# Patient Record
Sex: FEMALE | Race: BLACK OR AFRICAN AMERICAN | NOT HISPANIC OR LATINO | URBAN - METROPOLITAN AREA
[De-identification: names, ages, dates, MRNs, and addresses within clinical notes are randomized per-mention and may not be internally consistent; named-entity substitution may affect disease eponyms.]

---

## 2023-10-01 ENCOUNTER — HOSPITAL ENCOUNTER (EMERGENCY)
Facility: HOSPITAL | Age: 58
Discharge: HOME | End: 2023-10-01
Attending: EMERGENCY MEDICINE
Payer: COMMERCIAL

## 2023-10-01 ENCOUNTER — APPOINTMENT (OUTPATIENT)
Dept: RADIOLOGY | Facility: HOSPITAL | Age: 58
End: 2023-10-01
Payer: COMMERCIAL

## 2023-10-01 VITALS
TEMPERATURE: 98.1 F | BODY MASS INDEX: 23.95 KG/M2 | RESPIRATION RATE: 16 BRPM | HEART RATE: 87 BPM | SYSTOLIC BLOOD PRESSURE: 149 MMHG | HEIGHT: 68 IN | DIASTOLIC BLOOD PRESSURE: 85 MMHG | WEIGHT: 158 LBS | OXYGEN SATURATION: 100 %

## 2023-10-01 DIAGNOSIS — M25.572 ACUTE LEFT ANKLE PAIN: ICD-10-CM

## 2023-10-01 DIAGNOSIS — S93.402A SPRAIN OF LEFT ANKLE, INITIAL ENCOUNTER: Primary | ICD-10-CM

## 2023-10-01 PROCEDURE — 70450 CT HEAD/BRAIN W/O DYE: CPT | Performed by: STUDENT IN AN ORGANIZED HEALTH CARE EDUCATION/TRAINING PROGRAM

## 2023-10-01 PROCEDURE — 73590 X-RAY EXAM OF LOWER LEG: CPT | Mod: LT,FY

## 2023-10-01 PROCEDURE — 73590 X-RAY EXAM OF LOWER LEG: CPT | Mod: LEFT SIDE | Performed by: RADIOLOGY

## 2023-10-01 PROCEDURE — 99284 EMERGENCY DEPT VISIT MOD MDM: CPT | Performed by: EMERGENCY MEDICINE

## 2023-10-01 PROCEDURE — 73610 X-RAY EXAM OF ANKLE: CPT | Mod: LEFT SIDE | Performed by: RADIOLOGY

## 2023-10-01 PROCEDURE — 73630 X-RAY EXAM OF FOOT: CPT | Mod: LEFT SIDE | Performed by: RADIOLOGY

## 2023-10-01 PROCEDURE — 99284 EMERGENCY DEPT VISIT MOD MDM: CPT | Mod: 25 | Performed by: EMERGENCY MEDICINE

## 2023-10-01 PROCEDURE — 2500000001 HC RX 250 WO HCPCS SELF ADMINISTERED DRUGS (ALT 637 FOR MEDICARE OP): Mod: SE | Performed by: EMERGENCY MEDICINE

## 2023-10-01 PROCEDURE — 73630 X-RAY EXAM OF FOOT: CPT | Mod: LT,FY

## 2023-10-01 PROCEDURE — 73610 X-RAY EXAM OF ANKLE: CPT | Mod: LT

## 2023-10-01 PROCEDURE — 70450 CT HEAD/BRAIN W/O DYE: CPT

## 2023-10-01 RX ORDER — ACETAMINOPHEN 325 MG/1
650 TABLET ORAL ONCE
Status: COMPLETED | OUTPATIENT
Start: 2023-10-01 | End: 2023-10-01

## 2023-10-01 RX ADMIN — ACETAMINOPHEN 650 MG: 325 TABLET, FILM COATED ORAL at 14:19

## 2023-10-01 ASSESSMENT — PAIN - FUNCTIONAL ASSESSMENT: PAIN_FUNCTIONAL_ASSESSMENT: 0-10

## 2023-10-01 ASSESSMENT — COLUMBIA-SUICIDE SEVERITY RATING SCALE - C-SSRS
4. HAVE YOU HAD THESE THOUGHTS AND HAD SOME INTENTION OF ACTING ON THEM?: NO
6. HAVE YOU EVER DONE ANYTHING, STARTED TO DO ANYTHING, OR PREPARED TO DO ANYTHING TO END YOUR LIFE?: YES
6. HAVE YOU EVER DONE ANYTHING, STARTED TO DO ANYTHING, OR PREPARED TO DO ANYTHING TO END YOUR LIFE?: NO
1. IN THE PAST MONTH, HAVE YOU WISHED YOU WERE DEAD OR WISHED YOU COULD GO TO SLEEP AND NOT WAKE UP?: YES
4. HAVE YOU HAD THESE THOUGHTS AND HAD SOME INTENTION OF ACTING ON THEM?: YES
2. HAVE YOU ACTUALLY HAD ANY THOUGHTS OF KILLING YOURSELF?: YES
5. HAVE YOU STARTED TO WORK OUT OR WORKED OUT THE DETAILS OF HOW TO KILL YOURSELF? DO YOU INTEND TO CARRY OUT THIS PLAN?: NO
5. HAVE YOU STARTED TO WORK OUT OR WORKED OUT THE DETAILS OF HOW TO KILL YOURSELF? DO YOU INTEND TO CARRY OUT THIS PLAN?: YES
6. HAVE YOU EVER DONE ANYTHING, STARTED TO DO ANYTHING, OR PREPARED TO DO ANYTHING TO END YOUR LIFE?: NO
2. HAVE YOU ACTUALLY HAD ANY THOUGHTS OF KILLING YOURSELF?: NO
1. IN THE PAST MONTH, HAVE YOU WISHED YOU WERE DEAD OR WISHED YOU COULD GO TO SLEEP AND NOT WAKE UP?: NO

## 2023-10-01 ASSESSMENT — PAIN DESCRIPTION - ORIENTATION: ORIENTATION: RIGHT

## 2023-10-01 ASSESSMENT — PAIN DESCRIPTION - PAIN TYPE: TYPE: ACUTE PAIN

## 2023-10-01 ASSESSMENT — PAIN DESCRIPTION - LOCATION: LOCATION: ANKLE

## 2023-10-01 ASSESSMENT — PAIN SCALES - GENERAL: PAINLEVEL_OUTOF10: 10 - WORST POSSIBLE PAIN
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
## 2023-10-05 NOTE — ED PROVIDER NOTES
I received Alesia Fernandez in signout from Dr. Ribeiro.  Please see the previous note for all HPI, PE and MDM up to the time of signout at 3 pm on 10/1.     Briefly, 58-year-old female with past medical history of epilepsy and tremor with past pin placement in bilateral ankles presenting today after fall 9/30 at 4 PM. Pt was awaiting the result of CT head wo contrast which resulted in no acute cerebral hemorrhage, depressed skill fracture or  acute intracranial trauma. Pt was then discharged with instructions to take Tylenol at home for pain control. An ACE wrap was placed on the left foot. Pt was given instructions on taking care of her left ankle pain likely abrahan to an ankle sprain. Pt was advised to return to the Emergency Department if pain is out of control, swelling increases, and if joint is warmth and erythematous.     In brief Alesia Fernandez is an 58 y.o. female presenting for   Chief Complaint   Patient presents with    Ankle Pain     Pt fell yesterday on 9/30 into closet door and injured Right ankle. Pt denies thinners or LOC. Pt reports hitting right side of forehead.   .  At the time of signout we were awaiting:        Pt Disposition: Home    Procedures         Juli Torres MD  Resident  10/05/23 6664

## 2023-11-13 ENCOUNTER — HOSPITAL ENCOUNTER (EMERGENCY)
Facility: HOSPITAL | Age: 58
Discharge: HOME | End: 2023-11-14
Attending: EMERGENCY MEDICINE
Payer: COMMERCIAL

## 2023-11-13 DIAGNOSIS — F41.9 ANXIETY: ICD-10-CM

## 2023-11-13 DIAGNOSIS — Z00.8 EVALUATION BY PSYCHIATRIC SERVICE REQUIRED: Primary | ICD-10-CM

## 2023-11-13 LAB
ALBUMIN SERPL BCP-MCNC: 4.4 G/DL (ref 3.4–5)
ALP SERPL-CCNC: 35 U/L (ref 33–110)
ALT SERPL W P-5'-P-CCNC: 6 U/L (ref 7–45)
ANION GAP SERPL CALC-SCNC: 12 MMOL/L (ref 10–20)
AST SERPL W P-5'-P-CCNC: 10 U/L (ref 9–39)
BILIRUB SERPL-MCNC: 0.4 MG/DL (ref 0–1.2)
BUN SERPL-MCNC: 8 MG/DL (ref 6–23)
CALCIUM SERPL-MCNC: 9.3 MG/DL (ref 8.6–10.6)
CHLORIDE SERPL-SCNC: 102 MMOL/L (ref 98–107)
CO2 SERPL-SCNC: 24 MMOL/L (ref 21–32)
CREAT SERPL-MCNC: 0.51 MG/DL (ref 0.5–1.05)
ERYTHROCYTE [DISTWIDTH] IN BLOOD BY AUTOMATED COUNT: 13.2 % (ref 11.5–14.5)
GFR SERPL CREATININE-BSD FRML MDRD: >90 ML/MIN/1.73M*2
GLUCOSE SERPL-MCNC: 100 MG/DL (ref 74–99)
HCT VFR BLD AUTO: 36.2 % (ref 36–46)
HGB BLD-MCNC: 12 G/DL (ref 12–16)
MAGNESIUM SERPL-MCNC: 1.89 MG/DL (ref 1.6–2.4)
MCH RBC QN AUTO: 27.8 PG (ref 26–34)
MCHC RBC AUTO-ENTMCNC: 33.1 G/DL (ref 32–36)
MCV RBC AUTO: 84 FL (ref 80–100)
NRBC BLD-RTO: 0 /100 WBCS (ref 0–0)
PLATELET # BLD AUTO: 218 X10*3/UL (ref 150–450)
POTASSIUM SERPL-SCNC: 3.7 MMOL/L (ref 3.5–5.3)
PROT SERPL-MCNC: 7.4 G/DL (ref 6.4–8.2)
RBC # BLD AUTO: 4.32 X10*6/UL (ref 4–5.2)
SODIUM SERPL-SCNC: 134 MMOL/L (ref 136–145)
WBC # BLD AUTO: 6.1 X10*3/UL (ref 4.4–11.3)

## 2023-11-13 PROCEDURE — 85027 COMPLETE CBC AUTOMATED: CPT | Performed by: EMERGENCY MEDICINE

## 2023-11-13 PROCEDURE — 80053 COMPREHEN METABOLIC PANEL: CPT | Performed by: EMERGENCY MEDICINE

## 2023-11-13 PROCEDURE — 93010 ELECTROCARDIOGRAM REPORT: CPT | Performed by: EMERGENCY MEDICINE

## 2023-11-13 PROCEDURE — 83735 ASSAY OF MAGNESIUM: CPT | Performed by: EMERGENCY MEDICINE

## 2023-11-13 PROCEDURE — 99285 EMERGENCY DEPT VISIT HI MDM: CPT | Performed by: EMERGENCY MEDICINE

## 2023-11-13 PROCEDURE — 36415 COLL VENOUS BLD VENIPUNCTURE: CPT | Performed by: EMERGENCY MEDICINE

## 2023-11-13 PROCEDURE — 99285 EMERGENCY DEPT VISIT HI MDM: CPT | Mod: 25

## 2023-11-13 PROCEDURE — 2500000001 HC RX 250 WO HCPCS SELF ADMINISTERED DRUGS (ALT 637 FOR MEDICARE OP): Mod: SE

## 2023-11-13 RX ORDER — LORAZEPAM 1 MG/1
TABLET ORAL
Status: COMPLETED
Start: 2023-11-13 | End: 2023-11-13

## 2023-11-13 RX ORDER — LORAZEPAM 0.5 MG/1
2 TABLET ORAL ONCE
Status: COMPLETED | OUTPATIENT
Start: 2023-11-13 | End: 2023-11-13

## 2023-11-13 RX ADMIN — LORAZEPAM 2 MG: 0.5 TABLET ORAL at 23:13

## 2023-11-13 RX ADMIN — LORAZEPAM 2 MG: 1 TABLET ORAL at 23:13

## 2023-11-13 SDOH — HEALTH STABILITY: MENTAL HEALTH: BEHAVIORS/MOOD: ANXIOUS

## 2023-11-13 ASSESSMENT — LIFESTYLE VARIABLES
HAVE PEOPLE ANNOYED YOU BY CRITICIZING YOUR DRINKING: NO
REASON UNABLE TO ASSESS: NO
EVER HAD A DRINK FIRST THING IN THE MORNING TO STEADY YOUR NERVES TO GET RID OF A HANGOVER: NO
EVER FELT BAD OR GUILTY ABOUT YOUR DRINKING: NO
HAVE YOU EVER FELT YOU SHOULD CUT DOWN ON YOUR DRINKING: NO

## 2023-11-13 ASSESSMENT — COLUMBIA-SUICIDE SEVERITY RATING SCALE - C-SSRS
6. HAVE YOU EVER DONE ANYTHING, STARTED TO DO ANYTHING, OR PREPARED TO DO ANYTHING TO END YOUR LIFE?: NO
1. IN THE PAST MONTH, HAVE YOU WISHED YOU WERE DEAD OR WISHED YOU COULD GO TO SLEEP AND NOT WAKE UP?: NO
2. HAVE YOU ACTUALLY HAD ANY THOUGHTS OF KILLING YOURSELF?: NO

## 2023-11-13 ASSESSMENT — PAIN SCALES - GENERAL: PAINLEVEL_OUTOF10: 0 - NO PAIN

## 2023-11-13 ASSESSMENT — PAIN - FUNCTIONAL ASSESSMENT: PAIN_FUNCTIONAL_ASSESSMENT: 0-10

## 2023-11-14 ENCOUNTER — DOCUMENTATION (OUTPATIENT)
Dept: SOCIAL WORK | Age: 58
End: 2023-11-14

## 2023-11-14 ENCOUNTER — CLINICAL SUPPORT (OUTPATIENT)
Dept: EMERGENCY MEDICINE | Facility: HOSPITAL | Age: 58
End: 2023-11-14
Payer: COMMERCIAL

## 2023-11-14 VITALS
OXYGEN SATURATION: 100 % | WEIGHT: 164 LBS | BODY MASS INDEX: 24.86 KG/M2 | HEIGHT: 68 IN | DIASTOLIC BLOOD PRESSURE: 68 MMHG | RESPIRATION RATE: 16 BRPM | SYSTOLIC BLOOD PRESSURE: 123 MMHG | TEMPERATURE: 97.9 F | HEART RATE: 84 BPM

## 2023-11-14 LAB
AMPHETAMINES UR QL SCN: NORMAL
APAP SERPL-MCNC: <10 UG/ML
ATRIAL RATE: 121 BPM
BARBITURATES UR QL SCN: NORMAL
BENZODIAZ UR QL SCN: NORMAL
BZE UR QL SCN: NORMAL
CANNABINOIDS UR QL SCN: NORMAL
ETHANOL SERPL-MCNC: <10 MG/DL
FENTANYL+NORFENTANYL UR QL SCN: NORMAL
OPIATES UR QL SCN: NORMAL
OXYCODONE+OXYMORPHONE UR QL SCN: NORMAL
P AXIS: 76 DEGREES
P OFFSET: 198 MS
P ONSET: 146 MS
PCP UR QL SCN: NORMAL
PR INTERVAL: 164 MS
Q ONSET: 228 MS
QRS COUNT: 20 BEATS
QRS DURATION: 56 MS
QT INTERVAL: 312 MS
QTC CALCULATION(BAZETT): 443 MS
QTC FREDERICIA: 394 MS
R AXIS: 73 DEGREES
SALICYLATES SERPL-MCNC: <3 MG/DL
T AXIS: 85 DEGREES
T OFFSET: 384 MS
VENTRICULAR RATE: 121 BPM

## 2023-11-14 PROCEDURE — 36415 COLL VENOUS BLD VENIPUNCTURE: CPT | Performed by: EMERGENCY MEDICINE

## 2023-11-14 PROCEDURE — 93005 ELECTROCARDIOGRAM TRACING: CPT

## 2023-11-14 PROCEDURE — 80329 ANALGESICS NON-OPIOID 1 OR 2: CPT | Mod: 59 | Performed by: EMERGENCY MEDICINE

## 2023-11-14 PROCEDURE — 80307 DRUG TEST PRSMV CHEM ANLYZR: CPT | Performed by: EMERGENCY MEDICINE

## 2023-11-14 PROCEDURE — 80320 DRUG SCREEN QUANTALCOHOLS: CPT | Performed by: EMERGENCY MEDICINE

## 2023-11-14 PROCEDURE — 99222 1ST HOSP IP/OBS MODERATE 55: CPT

## 2023-11-14 SDOH — HEALTH STABILITY: MENTAL HEALTH: WISH TO BE DEAD (PAST 1 MONTH): NO

## 2023-11-14 SDOH — ECONOMIC STABILITY: HOUSING INSECURITY: FEELS SAFE LIVING IN HOME: YES

## 2023-11-14 SDOH — HEALTH STABILITY: MENTAL HEALTH: DEPRESSION SYMPTOMS: NO PROBLEMS REPORTED OR OBSERVED.

## 2023-11-14 SDOH — HEALTH STABILITY: MENTAL HEALTH: SUICIDAL BEHAVIOR (LIFETIME): YES

## 2023-11-14 SDOH — ECONOMIC STABILITY: GENERAL

## 2023-11-14 SDOH — HEALTH STABILITY: MENTAL HEALTH: ANXIETY SYMPTOMS: GENERALIZED;PANIC ATTACK

## 2023-11-14 SDOH — HEALTH STABILITY: MENTAL HEALTH: IN THE PAST WEEK, HAVE YOU BEEN HAVING THOUGHTS ABOUT KILLING YOURSELF?: NO

## 2023-11-14 SDOH — HEALTH STABILITY: MENTAL HEALTH: HAVE YOU EVER TRIED TO KILL YOURSELF?: YES

## 2023-11-14 SDOH — HEALTH STABILITY: MENTAL HEALTH: IN THE PAST FEW WEEKS, HAVE YOU FELT THAT YOU OR YOUR FAMILY WOULD BE BETTER OFF IF YOU WERE DEAD?: NO

## 2023-11-14 SDOH — HEALTH STABILITY: MENTAL HEALTH: SUICIDAL BEHAVIOR (3 MONTHS): NO

## 2023-11-14 SDOH — HEALTH STABILITY: MENTAL HEALTH: NON-SPECIFIC ACTIVE SUICIDAL THOUGHTS (PAST 1 MONTH): NO

## 2023-11-14 SDOH — HEALTH STABILITY: MENTAL HEALTH: ARE YOU HAVING THOUGHTS OF KILLING YOURSELF RIGHT NOW?: NO

## 2023-11-14 SDOH — HEALTH STABILITY: MENTAL HEALTH: IN THE PAST FEW WEEKS, HAVE YOU WISHED YOU WERE DEAD?: NO

## 2023-11-14 ASSESSMENT — LIFESTYLE VARIABLES
SUBSTANCE_ABUSE_PAST_12_MONTHS: NO
PRESCIPTION_ABUSE_PAST_12_MONTHS: NO

## 2023-11-14 ASSESSMENT — PAIN SCALES - GENERAL: PAINLEVEL_OUTOF10: 0 - NO PAIN
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
## 2023-11-14 NOTE — ED TRIAGE NOTES
Patient states that she was taking medication for epilepsy. Patient missed a dose of seizure medication and she is feeling anxious. Patient feels overwhelmed by medications and diagnoses. Patient feels anxious.

## 2023-11-14 NOTE — ED PROVIDER NOTES
CC: Anxiety     HPI: Patient is a 58-year-old female with history of anxiety, schizophrenia, tremors, presenting to the emergency department today with increased anxiety and tremors.  Patient reports on Friday that she lost her Risperdal and valproate and has not been able to take it for 3 days.  She finally found it this morning and took it but has been feeling increasingly anxious since this morning and is coming into the emergency department concerned that she is having another panic attack.  She has a lot of life stressors at the moment, and says she does not really have a support system at home.  Has 5 sons and daughter's that live in the city but they have not come to visit her in a long time.  She feels well and feels like her tremors are progressively getting worse.  Denies chest pain, shortness of breath, abdominal pain, or changes in urination/stool.  Reports she follows closely with neurology clinic and has another appointment on the 8th of December coming up to reassess her tremors. Reports her voices have been worse recently since missing a couple doses of risperidone.      Records Reviewed:  Recent available ED and inpatient notes reviewed in EMR.    PMHx/PSHx:  Per HPI.   - has no past medical history on file.  - has no past surgical history on file.  - does not have a problem list on file.    Medications:  Reviewed in EMR. See EMR for complete list of medications and doses.    Allergies:  Patient has no known allergies.    Social History:  - Tobacco:  has no history on file for tobacco use.   - Alcohol:  has no history on file for alcohol use.   - Illicit Drugs:  has no history on file for drug use.     ROS:  Per HPI.       ???????????????????????????????????????????????????????????????  Triage Vitals:  T 36.7 °C (98 °F)  HR  121  /67  RR 16  O2 100 % None (Room air)    Physical Exam  Vitals and nursing note reviewed.   Constitutional:       General: She is not in acute distress.      Appearance: She is well-developed.   HENT:      Head: Normocephalic and atraumatic.   Eyes:      Conjunctiva/sclera: Conjunctivae normal.   Cardiovascular:      Rate and Rhythm: Normal rate and regular rhythm.      Heart sounds: No murmur heard.  Pulmonary:      Effort: Pulmonary effort is normal. No respiratory distress.      Breath sounds: Normal breath sounds.   Abdominal:      Palpations: Abdomen is soft.      Tenderness: There is no abdominal tenderness.   Musculoskeletal:         General: No swelling.      Cervical back: Neck supple.   Skin:     General: Skin is warm and dry.      Capillary Refill: Capillary refill takes less than 2 seconds.   Neurological:      Mental Status: She is alert.      Comments: Resting tremors of the bilateral upper and lower extremities   Psychiatric:         Mood and Affect: Mood is anxious. Affect is tearful.       ???????????????????????????????????????????????????????????????  EKG:   Tachycardic to 121, regular rhythm.  CA, QRS, QTc intervals within normal limits.  No ST elevations, depressions, or T wave inversions.  Sinus tachycardia.    Medical Decision Making   Patient is a 58-year-old female presenting to the emergency panic attack.  On arrival, tachycardic to 121, rest of vitals within normal limits.  On examination, patient appears anxious and says this feels like her typical panic attacks.  She took her risperidone earlier today as prescribed and brought  it with her here in the emergency department.  Has been hearing more voices than usual recently, they are confusing her and are telling her negative things about herself. Denies SI or HI, no active plan. Was recently hospitalized in June of this year psychiatric decompensation. Labs acquired in triage showed a sodium of 134, potassium of 3.7, and a normal CBC.  Given her chronic history of tremors, I think the likely cause of her symptoms today is panic attack and anxiety.  She has been evaluated extensively by  neurology and lower concern for a primary neurologic diagnosis at this time.  We will give her 2 mg Ativan orally and reassess her symptoms. On reassessment after ativan, patient feels significantly clinically improved, says her anxiety has gone away. EPAT evaluated patient and agrees with out assessment. Patient will be discharged home with close psychiatric/neurology followup.    Diagnoses as of 11/14/23 1712   Evaluation by psychiatric service required   Anxiety       Social Determinants Limiting Care:      Disposition:   Discharge    Ceasar Locke MD  Emergency Medicine PGY2      Procedures ? [employer] last updated 11/13/2023 11:11 PM          Ceasar Locke MD  Resident  11/14/23 9825

## 2023-11-14 NOTE — CONSULTS
Referring Provider  Nicolette Martin    History Of Present Illness  Alesia Fernandez is a 58 y.o. female presenting to [facility] ED on 11/13/23 for c/c of anxiety after missing 1 day of medications.     Past Medical History  Epilepsy (chronic tremor)    Past Psychiatric History  Schizophrenia, unspecified anxiety, alcohol & cocaine abuse (both in remission), unspecified ID    Social History  She has no history on file for tobacco use, alcohol use, and drug use.     Current living situation:   Current employment/source of income:    Born and raised:   Education:   Legal history:   Access to weapons: denies    OP Mental Health Tx: [facility] Admissions: Extensive hx, including [facility] 6/2023, 3/2023; [facility] 7/2022, 5/2022; [facility] 7/2021; [facility] 5/2021; [facility] 9/2016; [facility] 6/2016; [facility] 5/2016; [facility] 9/2015, 8/2015; [facility] 8/2015, 7/2015; [facility];  [facility] 6/2014; [facility] 6/2014;  [facility] 3/2014; [facility] 3/2014;  [facility] 9/2013; multiple prior.    Self-harm or Suicide Attempts: Per chart- hx of suicide attempts via OD on pills, OD on drugs, and bleach ingestion.    Past/current Psychiatric Meds/Treatments: Pt reports current meds as Depakote, Risperdal, and Trazadone. Past meds include IM Invega, IM Haldol, Cogentin, Lamictal, hydroxyzine, Zoloft, Prozac, Lithium, and Zyprexa.      Name/Phone Number: Portia Cesar   Last Appointment Date: a week ago, per pt  Provider Name/Phone Number: Dr. Longoria  Provider Last Appointment Date: 2 weeks ago, per pt     Support System:  (adult children)     Living Arrangement: Lives alone, Apartment       Allergies  Patient has no known allergies.    Review of Systems    Psychiatric ROS - Adult  Anxiety: General Anxiety Disorder (TOMMY)TOMMY Behaviors: excessive anxiety/worry and difficulty concentrating  Depression: negative  Delirium: negative  Psychosis: auditory hallucinations and intermittent,  "chronic AH. None at time of interview  Imelda: negative  Safety Issues: none    Physical Exam      Mental Status Exam  General: 57 y/o AA female, reclined in ED cot in hallway care space. Fair grooming.   Appearance: appears stated age  Attitude: calm, cooperative  Behavior: appropriate eye contact  Motor Activity: no PMR. Chronic tremor observed in hands. No TD/EPS. Gait not assessed  Speech: low volume, appropriate rate, rhythm, tone. Spontaneous, fluent.   Mood: \"okay\"  Affect: blunted, smiles when discussing her 15 grandchildren  Thought Process: linear, concrete  Thought Content: denies SI/HI & overt delusions  Thought Perception: denies current AVH. Reports chronic, fleeting AH that are not command in nature. Does not appear to be RTIS during assessment.   Cognition: alert, oriented x3  Insight: fair  Judgement: appropriate/fair    Psychiatric Risk Assessment  Violence Risk Assessment: major mental illness  Acute Risk of Harm to Others is Considered: low   Suicide Risk Assessment: chronic medical illness, prior suicide attempt, and unmarried  Protective Factors against Suicide: adherence to  treatment, hopefulness/future orientation, positive family relationships, and social support/connectedness  Acute Risk of Harm to Self is Considered: low    Last Recorded Vitals  Blood pressure 123/68, pulse 84, temperature 36.6 °C (97.9 °F), temperature source Tympanic, resp. rate 16, height 1.727 m (5' 8\"), weight 74.4 kg (164 lb), SpO2 100 %.    Relevant Results    Assessment/Plan     The pt was initially seen by psychiatric social work & recommended for outpatient follow up. Following the assessment, the ED reports the pt had a verbal outburst & threatened bystanders & ED staff. All staff members present during this reported episode have gone home/off shift, so specific details are unclear. The ED resident requested a follow-up evaluation.    On re-assessment this morning, the pt is seen laying in ED cot with an empty " "breakfast tray at her side. She explains that she arrived to ED feeling anxious due to missing 2 days worth of her risperdal & depakote (for seizures) as she misplaced her bubble pack of meds. The pt is calm, cooperative & forward thinking. She is excited for Thanksgiving next week to see her family. She has close outpatient follow-up through [facility]. She does voice chronic, intermittent AH that \"ask me how I'm doing\"; she denies that the AH are command in nature. She reports feeling anxious that she was going to have a seizure & was having a hard time settling herself down. She did receive ativan 2 mg PO last night at 2313 with positive clinical response.     When asked of reports of verbal agitation &/or threats reported by ED staff, the pt denies this. She denies feeling agitated since arrival or having any outbursts. She denies responding to any hallucinatory stimuli.    I agree with prior recommendation for outpatient follow-up. No indication for inpatient psychiatric admission at this time.    Impression  Schizophrenia  TOMMY  ID      Recommendations  Following a chart review, safety risk assessment, interview with pt and ED staff, pt does not meet criteria for involuntary inpatient psychiatric hospitalization at this time. I am not recommending any medication management changes. Please encourage pt to follow-up with outpatient provider.     I discussed these recommendations with the current ED provider (Dr. Rubio) who was in agreement with above plan of care.       I spent 60 minutes in the professional and overall care of this patient.      Medication Consent  Medication Consent: n/a; consult service    Stephanie Bolivar, APRN-CNP        "

## 2023-11-14 NOTE — ED NOTES
W ED NOTE 11/14/23  Patient is being provided an follow up ED appointment for 12/4/23 @ 2p  [facility] [id number]  With Dr. Ken Fernandez   [phone]    Patient has appointment reminders on her profile         [facility]ylar Sanchez, [facility]  11/14/23 0999

## 2023-11-14 NOTE — PROGRESS NOTES
Alesia Fernandez is a 58 y.o. female discharging from ED. RN stated need for staff to accompany Pt to car.    Assessment/Plan   Met with Pt. She confirmed she had her cell and her address is correct in the system. CHW Skylar Sanchez consulted for ride. Roundtrip dispatched and CHW accompanied Pt to ride.     FRANDY West

## 2023-11-14 NOTE — DISCHARGE INSTRUCTIONS
Please return to the emergency department, should you have any worsening symptoms, are unable to get an appointment with your primary care physician within the discussed time frame, or have any further concerns.       Please follow up with:   Primary care within 3 days.  Please continue to take your olanzapine.    You make take ibuprofen or tylenol for pain. You may alternate ibuprofen and tylenol every 6 hours for better pain control.    Do not exceed 2400mg of ibuprofen or 4000mg of tylenol in a 24 hour period.      If you were given antibiotics, please complete the entire course. If you are unable to tolerate your antibiotics, please follow up with your primary care doctor or return to the emergency department.

## 2023-11-14 NOTE — PROGRESS NOTES
Emergency Medicine Transition of Care Note.      I received Alesia Fernandez in signout from Dr. Locke.  Please see the previous ED provider note for all HPI, PE and MDM up to the time of signout at 0700. This is in addition to the primary record. In brief Alesia Fernandez is an 58 y.o. female presenting for   Chief Complaint   Patient presents with    Anxiety    .  At the time of signout we were awaiting: awaiting repeat EPAT eval.        Medical Decision Making    Briefly this is a 58-year-old female with history of hallucinations, and extensive psychiatric admissions initially presented to the ED with anxiety, had acute episode of auditory hallucinations while in the ED.  On my reevaluation, patient was stable, speaking clearly, denying any active auditory hallucinations at this time.  Patient was reevaluated by EPAT team, and did not meet criteria for admission.  She is currently stable, denying any SI/HI at this time.  Patient is well-established in the outpatient setting, and is comfortable with discharge home.  Discharged in stable condition.      Final diagnoses:   [Z00.8] Evaluation by psychiatric service required   [F41.9] Anxiety     Dispo: discharge home, return precautions given.      Cricket Rubio, DO  Emergency Medicine , PGY-2

## 2023-11-28 PROBLEM — Z91.148 NONCOMPLIANCE WITH MEDICATION REGIMEN: Status: ACTIVE | Noted: 2018-02-26

## 2023-11-28 PROBLEM — F17.200 NICOTINE USE DISORDER: Status: ACTIVE | Noted: 2022-05-18

## 2023-11-28 PROBLEM — E66.9 OBESITY: Status: ACTIVE | Noted: 2018-03-05

## 2023-11-28 PROBLEM — G40.309 NONINTRACTABLE GENERALIZED IDIOPATHIC EPILEPSY WITHOUT STATUS EPILEPTICUS (MULTI): Status: ACTIVE | Noted: 2017-05-11

## 2023-11-28 PROBLEM — R26.9 GAIT ABNORMALITY: Status: ACTIVE | Noted: 2019-03-11

## 2023-11-28 PROBLEM — Z98.51 TUBAL LIGATION STATUS: Status: ACTIVE | Noted: 2023-11-28

## 2023-11-28 PROBLEM — M25.511 CHRONIC RIGHT SHOULDER PAIN: Status: ACTIVE | Noted: 2022-06-02

## 2023-11-28 PROBLEM — S62.618A CLOSED FRACTURE OF PROXIMAL PHALANX OF LITTLE FINGER: Status: ACTIVE | Noted: 2023-11-28

## 2023-11-28 PROBLEM — E43 SEVERE PROTEIN-CALORIE MALNUTRITION (MULTI): Status: ACTIVE | Noted: 2022-05-18

## 2023-11-28 PROBLEM — G89.29 CHRONIC RIGHT SHOULDER PAIN: Status: ACTIVE | Noted: 2022-06-02

## 2023-11-28 PROBLEM — S82.851D: Status: ACTIVE | Noted: 2023-11-28

## 2023-11-28 PROBLEM — F25.9 SCHIZOAFFECTIVE DISORDER (MULTI): Status: ACTIVE | Noted: 2022-07-30

## 2023-11-28 PROBLEM — G56.01 CARPAL TUNNEL SYNDROME OF RIGHT WRIST: Status: ACTIVE | Noted: 2019-03-11

## 2023-11-28 PROBLEM — S82.899A CLOSED FRACTURE OF ANKLE: Status: ACTIVE | Noted: 2023-11-28

## 2023-11-28 PROBLEM — R41.3 MEMORY LOSS: Status: ACTIVE | Noted: 2019-03-11

## 2023-11-28 RX ORDER — RISPERIDONE 3 MG/1
TABLET ORAL
COMMUNITY

## 2023-11-28 RX ORDER — LAMOTRIGINE 25 MG/1
TABLET ORAL
COMMUNITY

## 2023-11-28 RX ORDER — PHENYTOIN SODIUM 100 MG/1
CAPSULE, EXTENDED RELEASE ORAL
COMMUNITY
Start: 2017-12-05

## 2023-11-28 RX ORDER — NAPROXEN 500 MG/1
TABLET ORAL
COMMUNITY
Start: 2017-06-21

## 2023-11-28 RX ORDER — PALIPERIDONE PALMITATE 156 MG/ML
INJECTION INTRAMUSCULAR
COMMUNITY

## 2023-11-28 RX ORDER — RISPERIDONE 4 MG/1
TABLET ORAL
COMMUNITY
Start: 2023-11-08

## 2023-11-28 RX ORDER — TRAZODONE HYDROCHLORIDE 50 MG/1
50 TABLET ORAL NIGHTLY
COMMUNITY
Start: 2023-05-24

## 2023-11-28 RX ORDER — LAMOTRIGINE 100 MG/1
100 TABLET ORAL 2 TIMES DAILY
COMMUNITY
Start: 2023-09-13

## 2023-11-28 RX ORDER — OXYCODONE AND ACETAMINOPHEN 5; 325 MG/1; MG/1
TABLET ORAL
COMMUNITY
Start: 2017-07-10

## 2023-11-28 RX ORDER — IBUPROFEN 800 MG/1
800 TABLET ORAL EVERY 8 HOURS PRN
COMMUNITY
Start: 2023-10-13

## 2023-11-28 RX ORDER — TRAZODONE HYDROCHLORIDE 100 MG/1
100 TABLET ORAL NIGHTLY
COMMUNITY

## 2023-11-28 RX ORDER — IBUPROFEN 800 MG/1
1 TABLET ORAL EVERY 8 HOURS PRN
COMMUNITY
Start: 2018-04-26

## 2023-11-28 RX ORDER — POLYETHYLENE GLYCOL 3350 17 G/17G
17 POWDER, FOR SOLUTION ORAL
COMMUNITY
Start: 2017-07-19

## 2023-11-28 RX ORDER — ADHESIVE BANDAGE
BANDAGE TOPICAL
COMMUNITY
Start: 2017-07-19

## 2023-11-28 RX ORDER — MICONAZOLE NITRATE 2 %
2 CREAM (GRAM) TOPICAL
COMMUNITY
Start: 2022-08-19

## 2023-11-28 RX ORDER — OLANZAPINE 20 MG/1
TABLET ORAL
COMMUNITY
Start: 2018-01-02

## 2023-11-28 RX ORDER — NITROFURANTOIN 25; 75 MG/1; MG/1
CAPSULE ORAL
COMMUNITY
Start: 2023-07-28

## 2023-11-28 RX ORDER — PALIPERIDONE PALMITATE 234 MG/1.5ML
234 INJECTION INTRAMUSCULAR
COMMUNITY
Start: 2022-09-07

## 2023-11-28 RX ORDER — SERTRALINE HYDROCHLORIDE 100 MG/1
1 TABLET, FILM COATED ORAL
COMMUNITY
Start: 2022-08-19

## 2024-07-19 ENCOUNTER — CLINICAL SUPPORT (OUTPATIENT)
Dept: EMERGENCY MEDICINE | Facility: HOSPITAL | Age: 59
End: 2024-07-19
Payer: COMMERCIAL

## 2024-07-19 ENCOUNTER — HOSPITAL ENCOUNTER (OUTPATIENT)
Facility: HOSPITAL | Age: 59
Setting detail: OBSERVATION
Discharge: OTHER INSTITUTION | End: 2024-07-20
Attending: EMERGENCY MEDICINE | Admitting: EMERGENCY MEDICINE
Payer: COMMERCIAL

## 2024-07-19 DIAGNOSIS — Z76.89 ENCOUNTER FOR PSYCHIATRIC ASSESSMENT: Primary | ICD-10-CM

## 2024-07-19 LAB
ALBUMIN SERPL BCP-MCNC: 4.4 G/DL (ref 3.4–5)
ALP SERPL-CCNC: 29 U/L (ref 33–110)
ALT SERPL W P-5'-P-CCNC: 5 U/L (ref 7–45)
AMPHETAMINES UR QL SCN: NORMAL
ANION GAP SERPL CALC-SCNC: 15 MMOL/L (ref 10–20)
APAP SERPL-MCNC: <10 UG/ML
AST SERPL W P-5'-P-CCNC: 10 U/L (ref 9–39)
BARBITURATES UR QL SCN: NORMAL
BASOPHILS # BLD AUTO: 0.01 X10*3/UL (ref 0–0.1)
BASOPHILS NFR BLD AUTO: 0.2 %
BENZODIAZ UR QL SCN: NORMAL
BILIRUB SERPL-MCNC: 0.4 MG/DL (ref 0–1.2)
BUN SERPL-MCNC: 13 MG/DL (ref 6–23)
BZE UR QL SCN: NORMAL
CALCIUM SERPL-MCNC: 9.5 MG/DL (ref 8.6–10.6)
CANNABINOIDS UR QL SCN: NORMAL
CHLORIDE SERPL-SCNC: 102 MMOL/L (ref 98–107)
CO2 SERPL-SCNC: 23 MMOL/L (ref 21–32)
CREAT SERPL-MCNC: 0.69 MG/DL (ref 0.5–1.05)
EGFRCR SERPLBLD CKD-EPI 2021: >90 ML/MIN/1.73M*2
EOSINOPHIL # BLD AUTO: 0.1 X10*3/UL (ref 0–0.7)
EOSINOPHIL NFR BLD AUTO: 2 %
ERYTHROCYTE [DISTWIDTH] IN BLOOD BY AUTOMATED COUNT: 15.1 % (ref 11.5–14.5)
ETHANOL SERPL-MCNC: <10 MG/DL
FENTANYL+NORFENTANYL UR QL SCN: NORMAL
GLUCOSE SERPL-MCNC: 106 MG/DL (ref 74–99)
HCT VFR BLD AUTO: 36.7 % (ref 36–46)
HGB BLD-MCNC: 12.1 G/DL (ref 12–16)
IMM GRANULOCYTES # BLD AUTO: 0.01 X10*3/UL (ref 0–0.7)
IMM GRANULOCYTES NFR BLD AUTO: 0.2 % (ref 0–0.9)
LYMPHOCYTES # BLD AUTO: 2.3 X10*3/UL (ref 1.2–4.8)
LYMPHOCYTES NFR BLD AUTO: 46.8 %
MCH RBC QN AUTO: 27.9 PG (ref 26–34)
MCHC RBC AUTO-ENTMCNC: 33 G/DL (ref 32–36)
MCV RBC AUTO: 85 FL (ref 80–100)
METHADONE UR QL SCN: NORMAL
MONOCYTES # BLD AUTO: 0.46 X10*3/UL (ref 0.1–1)
MONOCYTES NFR BLD AUTO: 9.4 %
NEUTROPHILS # BLD AUTO: 2.03 X10*3/UL (ref 1.2–7.7)
NEUTROPHILS NFR BLD AUTO: 41.4 %
NRBC BLD-RTO: 0 /100 WBCS (ref 0–0)
OPIATES UR QL SCN: NORMAL
OXYCODONE+OXYMORPHONE UR QL SCN: NORMAL
PCP UR QL SCN: NORMAL
PLATELET # BLD AUTO: 216 X10*3/UL (ref 150–450)
POTASSIUM SERPL-SCNC: 4.2 MMOL/L (ref 3.5–5.3)
PROT SERPL-MCNC: 7.1 G/DL (ref 6.4–8.2)
RBC # BLD AUTO: 4.34 X10*6/UL (ref 4–5.2)
SALICYLATES SERPL-MCNC: <3 MG/DL
SODIUM SERPL-SCNC: 136 MMOL/L (ref 136–145)
WBC # BLD AUTO: 4.9 X10*3/UL (ref 4.4–11.3)

## 2024-07-19 PROCEDURE — 80143 DRUG ASSAY ACETAMINOPHEN: CPT

## 2024-07-19 PROCEDURE — 85025 COMPLETE CBC W/AUTO DIFF WBC: CPT

## 2024-07-19 PROCEDURE — 93005 ELECTROCARDIOGRAM TRACING: CPT

## 2024-07-19 PROCEDURE — 80307 DRUG TEST PRSMV CHEM ANLYZR: CPT

## 2024-07-19 PROCEDURE — 93010 ELECTROCARDIOGRAM REPORT: CPT | Performed by: EMERGENCY MEDICINE

## 2024-07-19 PROCEDURE — 99285 EMERGENCY DEPT VISIT HI MDM: CPT

## 2024-07-19 PROCEDURE — 36415 COLL VENOUS BLD VENIPUNCTURE: CPT

## 2024-07-19 PROCEDURE — 99285 EMERGENCY DEPT VISIT HI MDM: CPT | Performed by: EMERGENCY MEDICINE

## 2024-07-19 PROCEDURE — 80053 COMPREHEN METABOLIC PANEL: CPT

## 2024-07-19 SDOH — HEALTH STABILITY: MENTAL HEALTH: ACTIVE SUICIDAL IDEATION WITH SOME INTENT TO ACT, WITHOUT SPECIFIC PLAN (PAST 1 MONTH): YES

## 2024-07-19 SDOH — HEALTH STABILITY: MENTAL HEALTH: ANXIETY SYMPTOMS: GENERALIZED;FEELINGS OF DOOM

## 2024-07-19 SDOH — HEALTH STABILITY: MENTAL HEALTH: DEPRESSION SYMPTOMS: FEELINGS OF HELPLESSNESS;FEELINGS OF HOPELESSESS;FEELINGS OF WORTHLESSNESS

## 2024-07-19 SDOH — HEALTH STABILITY: MENTAL HEALTH: ACTIVE SUICIDAL IDEATION WITH SPECIFIC PLAN AND INTENT (PAST 1 MONTH): NO

## 2024-07-19 SDOH — HEALTH STABILITY: MENTAL HEALTH: NON-SPECIFIC ACTIVE SUICIDAL THOUGHTS (PAST 1 MONTH): YES

## 2024-07-19 SDOH — HEALTH STABILITY: MENTAL HEALTH: SUICIDAL BEHAVIOR (LIFETIME): YES

## 2024-07-19 SDOH — ECONOMIC STABILITY: HOUSING INSECURITY: FEELS SAFE LIVING IN HOME: YES

## 2024-07-19 SDOH — HEALTH STABILITY: MENTAL HEALTH: SUICIDAL BEHAVIOR (DESCRIPTION): SI NO PLAN

## 2024-07-19 SDOH — HEALTH STABILITY: MENTAL HEALTH: WISH TO BE DEAD (PAST 1 MONTH): YES

## 2024-07-19 SDOH — HEALTH STABILITY: MENTAL HEALTH: SUICIDAL BEHAVIOR (3 MONTHS): YES

## 2024-07-19 SDOH — HEALTH STABILITY: MENTAL HEALTH: IN THE PAST WEEK, HAVE YOU BEEN HAVING THOUGHTS ABOUT KILLING YOURSELF?: YES

## 2024-07-19 SDOH — HEALTH STABILITY: MENTAL HEALTH: IN THE PAST FEW WEEKS, HAVE YOU FELT THAT YOU OR YOUR FAMILY WOULD BE BETTER OFF IF YOU WERE DEAD?: YES

## 2024-07-19 SDOH — HEALTH STABILITY: MENTAL HEALTH: ARE YOU HAVING THOUGHTS OF KILLING YOURSELF RIGHT NOW?: YES

## 2024-07-19 SDOH — HEALTH STABILITY: MENTAL HEALTH: IN THE PAST FEW WEEKS, HAVE YOU WISHED YOU WERE DEAD?: YES

## 2024-07-19 SDOH — HEALTH STABILITY: MENTAL HEALTH: HAVE YOU EVER TRIED TO KILL YOURSELF?: NO

## 2024-07-19 ASSESSMENT — COLUMBIA-SUICIDE SEVERITY RATING SCALE - C-SSRS
5. HAVE YOU STARTED TO WORK OUT OR WORKED OUT THE DETAILS OF HOW TO KILL YOURSELF? DO YOU INTEND TO CARRY OUT THIS PLAN?: NO
4. HAVE YOU HAD THESE THOUGHTS AND HAD SOME INTENTION OF ACTING ON THEM?: NO
6. HAVE YOU EVER DONE ANYTHING, STARTED TO DO ANYTHING, OR PREPARED TO DO ANYTHING TO END YOUR LIFE?: YES
1. IN THE PAST MONTH, HAVE YOU WISHED YOU WERE DEAD OR WISHED YOU COULD GO TO SLEEP AND NOT WAKE UP?: YES
6. HAVE YOU EVER DONE ANYTHING, STARTED TO DO ANYTHING, OR PREPARED TO DO ANYTHING TO END YOUR LIFE?: YES
2. HAVE YOU ACTUALLY HAD ANY THOUGHTS OF KILLING YOURSELF?: YES

## 2024-07-19 ASSESSMENT — LIFESTYLE VARIABLES
PRESCIPTION_ABUSE_PAST_12_MONTHS: NO
SUBSTANCE_ABUSE_PAST_12_MONTHS: NO

## 2024-07-19 NOTE — ED TRIAGE NOTES
Pt arrives via [facility] for complaints of suicidal thoughts and auditory hallucinations. Pt was found to be in her normal state earlier today in her apartment then she locked her self out of her apartment and began to become agitated and increasingly suicidal. Pt states that she does not want to be alive anymore but is not endorsing a plan to commit suicide. Pt does have a history of bipolar disorder and schizophrenia.

## 2024-07-20 VITALS
HEIGHT: 68 IN | SYSTOLIC BLOOD PRESSURE: 133 MMHG | RESPIRATION RATE: 16 BRPM | OXYGEN SATURATION: 94 % | TEMPERATURE: 97.7 F | HEART RATE: 70 BPM | DIASTOLIC BLOOD PRESSURE: 70 MMHG | WEIGHT: 165 LBS | BODY MASS INDEX: 25.01 KG/M2

## 2024-07-20 PROBLEM — F22 PSYCHOSIS, PARANOID (MULTI): Status: ACTIVE | Noted: 2024-07-20

## 2024-07-20 LAB
ATRIAL RATE: 82 BPM
P AXIS: 61 DEGREES
P OFFSET: 186 MS
P ONSET: 132 MS
PR INTERVAL: 178 MS
Q ONSET: 221 MS
QRS COUNT: 14 BEATS
QRS DURATION: 72 MS
QT INTERVAL: 364 MS
QTC CALCULATION(BAZETT): 425 MS
QTC FREDERICIA: 404 MS
R AXIS: 20 DEGREES
SARS-COV-2 RNA RESP QL NAA+PROBE: NOT DETECTED
T AXIS: 84 DEGREES
T OFFSET: 403 MS
VENTRICULAR RATE: 82 BPM

## 2024-07-20 PROCEDURE — 2500000002 HC RX 250 W HCPCS SELF ADMINISTERED DRUGS (ALT 637 FOR MEDICARE OP, ALT 636 FOR OP/ED): Mod: SE

## 2024-07-20 PROCEDURE — G0378 HOSPITAL OBSERVATION PER HR: HCPCS

## 2024-07-20 PROCEDURE — 2500000001 HC RX 250 WO HCPCS SELF ADMINISTERED DRUGS (ALT 637 FOR MEDICARE OP): Mod: SE

## 2024-07-20 PROCEDURE — 87635 SARS-COV-2 COVID-19 AMP PRB: CPT

## 2024-07-20 RX ORDER — LAMOTRIGINE 100 MG/1
100 TABLET ORAL 2 TIMES DAILY
Status: DISCONTINUED | OUTPATIENT
Start: 2024-07-20 | End: 2024-07-20 | Stop reason: HOSPADM

## 2024-07-20 RX ORDER — RISPERIDONE 1 MG/1
4 TABLET ORAL ONCE
Status: DISCONTINUED | OUTPATIENT
Start: 2024-07-20 | End: 2024-07-20 | Stop reason: HOSPADM

## 2024-07-20 RX ORDER — SERTRALINE HYDROCHLORIDE 50 MG/1
100 TABLET, FILM COATED ORAL DAILY
Status: DISCONTINUED | OUTPATIENT
Start: 2024-07-20 | End: 2024-07-20 | Stop reason: HOSPADM

## 2024-07-20 RX ORDER — OLANZAPINE 5 MG/1
20 TABLET ORAL NIGHTLY
Status: DISCONTINUED | OUTPATIENT
Start: 2024-07-20 | End: 2024-07-20 | Stop reason: HOSPADM

## 2024-07-20 RX ADMIN — LAMOTRIGINE 100 MG: 100 TABLET ORAL at 08:19

## 2024-07-20 RX ADMIN — SERTRALINE 100 MG: 50 TABLET, FILM COATED ORAL at 08:18

## 2024-07-20 SDOH — HEALTH STABILITY: MENTAL HEALTH: SLEEP PATTERN: DIFFICULTY FALLING ASLEEP;DISTURBED/INTERRUPTED SLEEP

## 2024-07-20 SDOH — HEALTH STABILITY: MENTAL HEALTH: BEHAVIORS/MOOD: AGITATED;ANXIOUS;COOPERATIVE;PARANOID;LABILE;FLAT AFFECT;GUARDED

## 2024-07-20 SDOH — HEALTH STABILITY: MENTAL HEALTH

## 2024-07-20 SDOH — HEALTH STABILITY: MENTAL HEALTH: CONTENT: RELIGIOSITY

## 2024-07-20 ASSESSMENT — LIFESTYLE VARIABLES
TOTAL SCORE: 0
HAVE YOU EVER FELT YOU SHOULD CUT DOWN ON YOUR DRINKING: NO
EVER FELT BAD OR GUILTY ABOUT YOUR DRINKING: NO
EVER HAD A DRINK FIRST THING IN THE MORNING TO STEADY YOUR NERVES TO GET RID OF A HANGOVER: NO
HAVE PEOPLE ANNOYED YOU BY CRITICIZING YOUR DRINKING: NO

## 2024-07-20 NOTE — PROGRESS NOTES
"Observation History and Physical  [facility] EMERGENCY MEDICINE           History of Present Illness     History provided by: Patient  Limitations to History: Mental Illness  External Records Reviewed: N/A      Patient History:  Alesia Fernandez is a 58 y.o. female who presented to the emergency department endorsing suicidal ideation. Patient is requesting home meds, on brief review of medication history please include Lamictal 100 mg twice daily, Zyprexa 20 mg daily, Risperdal 4 mg daily, Zoloft 100 mg daily, these were ordered    Alesia Fernandez was escalated to observation status. Observation was necessary as they continue to require treatment and monitoring of their psychiatric illness while awaiting inpatient behavioral health bed availability.    Physical Exam     Visit Vitals  /70   Pulse 70   Temp 36.5 °C (97.7 °F) (Temporal)   Resp 16   Ht 1.727 m (5' 8\")   Wt 74.8 kg (165 lb)   SpO2 94%   BMI 25.09 kg/m²   BSA 1.89 m²       GENERAL:  The patient appears nourished and normally developed. Vital signs as documented.     PULMONARY:  Without any respiratory distress. Able to speak full sentences, no accessory muscle use    CARDIAC: Warm and well perfused. No cyanosis.    MUSCULOSKELETAL:   Able to ambulate, Non edematous, with no obvious deformities.     SKIN: No pallor. Intact.    NEURO:  No obvious neurological deficits.  Able to follow commands.    Psych: appropriate mood and affect      Impression and Plan     Alesia Fernandez under observation status in [facility] EMERGENCY MEDICINE for psychiatric illness monitoring and treatment while awaiting inpatient behavioral health bed availability.   Emergency Psychiatric Assessment Team has been consulted. Case discussed with them and decision for inpatient hospitalization deemed necessary. Patient is medically clear at this time. Home medications reviewed and restarted where clinically indicated. Patient and Family " updated on plan of care.     Edyta Camraena DO

## 2024-07-20 NOTE — DISCHARGE SUMMARY
"Observation Disposition Note  [facility] EMERGENCY MEDICINE             Subjective:       Alesia Fernandez has been under observation status in [facility] EMERGENCY MEDICINE for psychiatric illness monitoring and treatment while awaiting inpatient behavioral health bed availability.       Physical Exam     Visit Vitals  /70   Pulse 70   Temp 36.5 °C (97.7 °F) (Temporal)   Resp 16   Ht 1.727 m (5' 8\")   Wt 74.8 kg (165 lb)   SpO2 94%   BMI 25.09 kg/m²   BSA 1.89 m²       GENERAL:  The patient appears nourished and normally developed. Vital signs as documented.     PULMONARY:  Without any respiratory distress. Able to speak full sentences, no accessory muscle use    CARDIAC: Warm and well perfused. No cyanosis.    MUSCULOSKELETAL:   Able to ambulate, Non edematous, with no obvious deformities.     SKIN: No pallor. Intact.    NEURO:  No obvious neurological deficits.  Able to follow commands.    Psych: Reports suicidal ideation but is calm.       Impresssion and Plan     ED Course as of 07/20/24 1011   Fri Jul 19, 2024   1807 EKG interpreted by ED attending: Ventricular rate of 82 beats per minute.  Possible left atrial enlargement.  No ST or T wave elevations or inversions consistent with acute ischemia. [JT]   Sat Jul 20, 2024   0133 Patient is requesting home meds, on brief review of medication history please include Lamictal 100 mg twice daily, Zyprexa 20 mg daily, Risperdal 4 mg daily, Zoloft 100 mg daily, these were ordered [SA]      ED Course User Index  [JT] Lenin Parks MD MPH  [SA] Edyta Camarena DO         Diagnoses as of 07/20/24 1011   Encounter for psychiatric assessment       In summary, Alesia Fernandez has been cared under observation in [facility] for psychiatric illness monitoring and treatment while awaiting inpatient behavioral health bed availability.     Emergency Psychiatric Assessment Team was consulted. Case " discussed with them and decision for inpatient hospitalization deemed necessary.     Patient has been accepted at [facility]    Total length of observation was 2:51 hours. Dr. Dann Dietz, DO;Yi* is the disposition attending.    Discharge Diagnosis  Psychosis, paranoid (Multi)    Randi Gonsalves MD

## 2024-07-20 NOTE — PROGRESS NOTES
"EPAT - Social Work Psychiatric Assessment    Arrival Details  Mode of Arrival: Ambulance  Admission Source: Home  Admission Type: Involuntary  EPAT Assessment Start Date: 07/19/24  EPAT Assessment Start Time: 2000  Name of : Maximilian ANTONIO    History of Present Illness    Admission Reason: Evaluation    HPI:   Patient is a 58 year old female endorsing SI no plan. patient was transported from home by EMS. Patient has a history of schizoaffective disorder bipolar type. Provider note and chart history reviewed. Patient presents with flat affect, visible tremors, endorses current SI, no plans, \"I feel like I don't want to live anymore. I am dizzy all the time, I can't stop shaking, my head hurts. I am falling all the time in my home, I have a constant headache. My kids would rather spend time with their father than me. I am done\". Patient has no plan of suicide, feeling \"hopeless, helpless, worthless\".  Chart history reflect patient was hospitalized in January of 2024 for SI, AH,religiously pre-occupied. Stabilized and referred back to community at [facility] where she receives mental chad counseling and medication management. Patient denies HI, AH,VH. Collateral information received from daughter Tanna [phone], \"my mother(patient) called me this morning telling me she was suicidal and does not want to live anymore. I called her  at [facility] and 911. My mother (patient)  has been suicidal in the past, never had a plan. She was at [facility] in January of 2024. She was doing well when she came home, past couple of weeks, she started complaining of dizziness and she is falling more. The Dr believes her tremors are stemming from years of MH medications calling it Parakinesia.\"    SW Readmission Information   Readmission within 30 Days: No    Psychiatric Symptoms  Anxiety Symptoms: Generalized, Feelings of doom  Depression Symptoms: Feelings of helplessness, Feelings of " hopelessess, Feelings of worthlessness  Imelda Symptoms: No problems reported or observed.    Psychosis Symptoms  Hallucination Type: No problems reported or observed.  Delusion Type: No problems reported or observed.    Additional Symptoms - Adult  Generalized Anxiety Disorder: Restlessness, Sleep disturbance, Excessive anxiety/worry  Obsessive Compulsive Disorder: No problems reported or observed.  Panic Attack: No problems reported or observed.  Post Traumatic Stress Disorder: No problems reported or observed.  Delirium: No problems reported or observed.    Past Psychiatric History/Meds/Treatments  Past Psychiatric History: schizoaffective disorder biolar type  Past Psychiatric Meds/Treatments: depakote 500 mg x 2 daily, respirdone 3 mg x2 daily, trazodone 100 mg x 2 daily, Invega injection 1.5 mg mnthly  Past Violence/Victimization History: denies    Current Mental Health Contacts   Name/Phone Number: [facility] [phone]   Last Appointment Date: 2024  Provider Name/Phone Number: Dr [facility] [phone]  Provider Last Appointment Date: 2024    Support System: Immediate family, Neighbors, [employer]    Living Arrangement: Lives alone, Apartment    Home Safety  Feels Safe Living in Home: Yes  Home Safety : daughter and patient report no fire arms or lethal weapons in hme    Income Information  Employment Status for: Patient  Employment Status: Disabled  Income Source: Disability    Miltary Service/Education History  Current or Previous  Service: None  Education Level: Less than high school  History of Learning Problems: No  History of School Behavior Problems: No  School History: patient comleted 10th grade of HS    Social/Cultural History    Social History:   Patent reports to being raised in [place] by both parents who are . Patient has 4 sisters and 1 brother, no contact with siblings in many years. Patient has 5  grown children. Patient lives in own apartment. patient has no work history on SSDI for MH disability Patient enjoys watching TV and going to Spiritism. Patient has no other hobbies. patent identified her children and God as a source of support  Important Activities: Family, Other (Comment)    Legal  Legal Considerations: Patient/ Family Ability to Make Healthcare Decisions  Legal Concerns: none    Drug Screening  Have you used any substances (canabis, cocaine, heroin, hallucinogens, inhalants, etc.) in the past 12 months?: No  Have you used any prescription drugs other than prescribed in the past 12 months?: No  Is a toxicology screen needed?: Yes         Psychosocial  Psychosocial (WDL): Within Defined Limits    Orientation  Orientation Level: Oriented X4    General Appearance  Motor Activity: Gait exaggerated, Restlessness, Tremors  Speech Pattern: Pressured  General Attitude: Cooperative, Pleasant  Appearance/Hygiene: Unremarkable    Thought Process  Coherency: [place] thinking  Content: Unremarkable  Delusions: Other (Comment) (denies)  Perception: Not altered  Hallucination: None  Judgment/Insight: Limited  Confusion: Mild  Cognition: Impulsive    Sleep Pattern  Sleep Pattern: Disturbed/interrupted sleep    Risk Factors  Self Harm/Suicidal Ideation Plan: SI no plan  Previous Self Harm/Suicidal Plans: SI no plan  Risk Factors: Lower socioeconomic status, Lower IQ, Major mental illness    Violence Risk Assessment  Assessment of Violence: None noted  Thoughts of Harm to Others: No    Ability to Assess Risk Screen  Risk Screen - Ability to Assess: Able to be screened  Ask Suicide-Screening Questions  1. In the past few weeks, have you wished you were dead?: Yes  2. In the past few weeks, have you felt that you or your family would be better off if you were dead?: Yes  3. In the past week, have you been having thoughts about killing yourself?: Yes  4. Have you ever tried to kill yourself?: No  5. Are you having  thoughts of killing yourself right now?: Yes  Calculated Risk Score: Imminent Risk  [place]t Suicide Severity Rating Scale (Screener/Recent Self-Report)  1. Wish to be Dead (Past 1 Month): Yes  2. Non-Specific Active Suicidal Thoughts (Past 1 Month): Yes  3. Active Suicidal Ideation with any Methods (Not Plan) Without Intent to Act (Past 1 Month): No  4. Active Suicidal Ideation with Some Intent to Act, Without Specific Plan (Past 1 Month): Yes  5. Active Suicidal Ideation with Specific Plan and Intent (Past 1 Month): No  6. Suicidal Behavior (Lifetime): Yes  6. Suicidal Behavior (3 Months): Yes  6. Suicidal Behavior (Description): SI no plan  Calculated C-SSRS Risk Score (Lifetime/Recent): High Risk  Step 1: Risk Factors  Current & Past Psychiatric Dx: Psychotic disorder  Presenting Symptoms: Anxiety and/or panic, Hopelessness or despair, Impulsivity, Anhedonia  Family History: Other (Comment) (denies)  Precipitants/Stressors: Chronic physical pain or other acute medical problem (e.g. CNS disorders), Perceived burden on others, Social isolation  Change in Treatment: Other (Comment)  Access to Lethal Methods : No  Step 2: Protective Factors   Protective Factors Internal: Jain beliefs  Protective Factors External: Cultural, spiritual and/or moral attitudes against suicide, Positive therapeutic relationships, Supportive social network or family or friends  Step 3: Suicidal Ideation Intensity  How Many Times Have You Had These Thoughts: 2-5 times in a week  When You Have the Thoughts How Long do They Last : 1-4 hours/a lot of the time  Could/Can You Stop Thinking About Killing Yourself or Wanting to Die if You Want to: Can control thoughts with a lot of difficulty  Are There Things - Anyone or Anything - That Stopped You From Wanting to Die or Acting on: Uncertain that deterrents stopped you  What Sort of Reasons Did You Have For Thinking About Wanting to Die or Killing Yourself: Mostly to end or stop the pain  "(you couldn't go on living with the pain or how you were feeling)  Total Score: 17  Step 5: Documentation  Risk Level: High suicide risk    Psychiatric Impression and Plan of Care    Assessment and Plan:   Patient is a 58 year old female bught to ED by EMS after endorsing SI, no plan. Patient has history of schizoaffective disorder bipolar type. Patient endorses active SI, no plan, displays flat affect, visible body tremors, tearful at times. Patient denies suicide plan. Patient denies HI,AH,VH. C-SSRS reflect patient is high risk of possible self harm.  Patient explained, \"I am tired of living. I have a headache all the time, my body can't stop shaking. My children would rather be with their father than me. I have nothing to live for\". Patient described feelings of \"hopeless, helpless, worthless\". Patient lives alone. Collateral information received from daughter Angelia [phone], \"my mother(patient) was in the hospital in January for SI and psychosis. When she came home she was doing better. The  past 2 weeks she has become more depressed.  She has karen complaining of headaches and has fallen several times in her apartment. Her Dr is aware and is running tests. her tremors are believed to be from effects of her MH medications, parakinesia\". Daughter reports mother (patient) has endorsed SI in the past, \"mother (patient) never followed through, never has a plan\". Discussed need for higher level of care at this time for patient's safety with Dr Niki CARR who concurs. Daughter would prefer patient to return to [facility] where patient is familiar with. Initial referral for placement with by [facility] per family request.    Outcome/Disposition  Assessment, Recommendations and Risk Level Reviewed with: Dr Niki CARR  EPAT Assessment Completed Date: 07/19/24  EPAT Assessment Completed Time: 2120      "

## 2024-07-20 NOTE — ED PROVIDER NOTES
Emergency Department Provider Note        History of Present Illness     History provided by: Patient  Limitations to History: None  External Records Reviewed with Brief Summary: None    HPI:  Alesia Fernandez is a 58 y.o. female with history of schizoaffective disorder bipolar type presenting to the emergency department with suicidal ideation.  Patient states that she has been compliant with her medications and receives an injection monthly but over the past several days has felt depressed with consistent and persistent suicidal ideation.  Patient reports she does not feel like she wants to live anymore and feels hopeless, helpless, worthless.  Has no active plan for suicide.  Has no active thoughts of homicidal ideation, no auditory hallucinations.  Patient does not appear internally stimulated.  Patient frequently making statements that are hyperreligious.  Patient has previous history of suicidal ideation.  Is also complaining of a headache, and feels frustrated by her tremors.  Denies fever, chills, chest pain or shortness of breath, nausea, vomiting or diarrhea.  No abdominal pain.  No blood in the urine or blood in the stool.    Physical Exam   Triage vitals:  T 36.9 °C (98.4 °F)  HR 78  BP (!) 182/106 (BP taken on leg because patient was unable to stop shaking to tolerate a arm bp)  RR 16  O2 97 % None (Room air)    General: Awake, alert, in no acute distress  Eyes: Gaze conjugate.  No scleral icterus or injection  HENT: Normo-cephalic, atraumatic. No stridor  CV: Regular rate, regular rhythm. Radial pulses 2+ bilaterally  Resp: Breathing non-labored, speaking in full sentences.  Clear to auscultation bilaterally  GI: Soft, non-distended, non-tender. No rebound or guarding.  MSK/Extremities: No gross bony deformities. Moving all extremities  Skin: Warm. Appropriate color  Neuro: Alert. Oriented. Face symmetric. Speech is fluent.  Gross strength and sensation intact in b/l UE and Les. Ongoing tremor of  the bilateral upper extremities  Psych: Appropriate mood and affect    Medical Decision Making & ED Course   Medical Decision Makin y.o. female presenting to the emergency department with concern for suicidal ideation.  On physical exam, patient's affect is flat and her thoughts are disorganized.  Does not appear internally stimulated.  Labs ordered and EKG placed.  Patient provided food and drink.  EPAT consulted who came and evaluated the patient.  Based on their assessment, they are recommending placement as they agree that patient appears decompensated from a psychiatric standpoint.  Patient is medically cleared from my standpoint for placement to inpatient psych for further management and stabilization of mental health related needs.  At time of signout, patient was pending placement.  Patient signed out to incoming resident in stable condition.  ----     Social Determinants of Health which Significantly Impact Care: Mental health disorder The following actions were taken to address these social determinants: EPAT consulted    EKG Independent Interpretation: EKG interpreted by myself. Please see ED Course for full interpretation.    Independent Result Review and Interpretation: Relevant laboratory and radiographic results were reviewed and independently interpreted by myself.  As necessary, they are commented on in the ED Course.    Chronic conditions affecting the patient's care: As documented above in [facility]    The patient was discussed with the following consultants/services:  EPAT    Care Considerations: As documented above in [facility]    ED Course:  ED Course as of 24 1442   Fri 2024   1807 EKG interpreted by ED attending: Ventricular rate of 82 beats per minute.  Possible left atrial enlargement.  No ST or T wave elevations or inversions consistent with acute ischemia. [JT]   Sat 2024   0133 Patient is requesting home meds, on brief review of medication history please include Lamictal  100 mg twice daily, Zyprexa 20 mg daily, Risperdal 4 mg daily, Zoloft 100 mg daily, these were ordered [SA]      ED Course User Index  [JT] Lenin Parks MD MPH  [SA] Edyta Camarena DO         Diagnoses as of 07/21/24 1442   Encounter for psychiatric assessment     Disposition   Patient was signed out to Dr. Camarena at 11:00 PM pending completion of their work-up.  Please see the next provider's transition of care note for the remainder of the patient's care.     Procedures   Procedures    Patient seen and discussed with ED attending physician.    Connie Prince DO  Emergency Medicine        Connie Prince DO  Resident  07/21/24 3808

## 2024-07-20 NOTE — SIGNIFICANT EVENT
Application for Emergency Admission      Ready for Transfer?  Is the patient medically cleared for transfer to inpatient psychiatry: Yes  Has the patient been accepted to an inpatient psychiatric hospital: Yes    Application for Emergency Admission  IN ACCORDANCE WITH [place]  The Chief Clinical Officer of: [facility]  7/20/2024 .6:44 AM    Reason for Hospitalization  The undersigned has reason to believe that: Alesia Fernandez Is a mentally ill person subject to hospitalization by court order under division B Section 5122.01 of the Revised Code, i.e., this person:    1.Yes  Represents a substantial risk of physical harm to self as manifested by evidence of threats of, or attempts at, suicide or serious self-inflicted bodily harm    2.Yes Represents a substantial risk of physical harm to others as manifested by evidence of recent homicidal or other violent behavior, evidence of recent threats that place another in reasonable fear of violent behavior and serious physical harm, or other evidence of present dangerousness    3.Yes Represents a substantial and immediate risk of serious physical impairment or injury to self as manifested by  evidence that the person is unable to provide for and is not providing for the person's basic physical needs because of the person's mental illness and that appropriate provision for those needs cannot be made  immediately available in the community    4.Yes Would benefit from treatment in a hospital for his mental illness and is in need of such treatment as manifested by evidence of behavior that creates a grave and imminent risk to substantial rights of others or  himself.    5.Yes Would benefit from treatment as manifested by evidence of behavior that indicates all of the following:       (a) The person is unlikely to survive safely in the community without supervision, based on a clinical determination.       (b) The person has a history of lack of  compliance with treatment for mental illness and one of the following applies:      (i) At least twice within the thirty-six months prior to the filing of an affidavit seeking court-ordered treatment of the person under section 5122.111 of the Revised Code, the lack of compliance has been a significant factor in necessitating hospitalization in a hospital or receipt of services in a forensic or other mental health unit of a correctional facility, provided that the thirty-six-month period shall be extended by the length of any hospitalization or incarceration of the person that occurred within the thirty-six-month period.      (ii) Within the forty-eight months prior to the filing of an affidavit seeking court-ordered treatment of the person under section 5122.111 of the Revised Code, the lack of compliance resulted in one or more acts of serious violent behavior toward self or others or threats of, or attempts at, serious physical harm to self or others, provided that the forty-eight-month period shall be extended by the length of any hospitalization or incarceration of the person that occurred within the forty-eight-month period.      (c) The person, as a result of mental illness, is unlikely to voluntarily participate in necessary treatment.       (d) In view of the person's treatment history and current behavior, the person is in need of treatment in order to prevent a relapse or deterioration that would be likely to result in substantial risk of serious harm to the person or others.    (e) Represents a substantial risk of physical harm to self or others if allowed to remain at liberty pending examination.    Therefore, it is requested that said person be admitted to the above named facility.    STATEMENT OF BELIEF    Must be filled out by one of the following: a psychiatrist, licensed physician, licensed clinical psychologist, health or ,  or .  (Statement shall include the  "circumstances under which the individual was taken into custody and the reason for the person's belief that hospitalization is necessary. The statement shall also include a reference to efforts made to secure the individual's property at his residence if he was taken into custody there. Every reasonable and appropriate effort should be made to take this person into custody in the least conspicuous manner possible.)    Patient is a 58 year old female bught to ED by EMS after endorsing SI, no plan. Patient has history of schizoaffective disorder bipolar type. Patient endorses active SI, no plan, displays flat affect, visible body tremors, tearful at times. Patient denies suicide plan. Patient denies HI,AH,VH. C-SSRS reflect patient is high risk of possible self harm.  Patient explained, \"I am tired of living. I have a headache all the time, my body can't stop shaking. My children would rather be with their father than me. I have nothing to live for\". Patient described feelings of \"hopeless, helpless, worthless\". Patient lives alone. Collateral information received from daughter Angelia [phone], \"my mother(patient) was in the hospital in January for SI and psychosis. When she came home she was doing better. The  past 2 weeks she has become more depressed.  She has karen complaining of headaches and has fallen several times in her apartment. Her Dr is aware and is running tests. her tremors are believed to be from effects of her MH medications, parakinesia\". Daughter reports mother (patient) has endorsed SI in the past, \"mother (patient) never followed through, never has a plan\". Discussed need for higher level of care at this time for patient's safety .     Edyta Camarena DO 7/20/2024     _____________________________________________________________   Place of Employment: [employer]    STATEMENT OF OBSERVATION BY PSYCHIATRIST, LICENSED PHYSICIAN, OR LICENSED CLINICAL PSYCHOLOGIST, IF APPLICABLE    Place of " Observation (e.g., [facility], general hospital, office, emergency facility)  (If applicable, please complete)    Edyta Camarena DO 7/20/2024    _____________________________________________________________

## 2025-06-18 ENCOUNTER — HOSPITAL ENCOUNTER (OUTPATIENT)
Facility: HOSPITAL | Age: 60
Setting detail: OBSERVATION
Discharge: HOME HEALTH | End: 2025-06-19
Attending: EMERGENCY MEDICINE | Admitting: STUDENT IN AN ORGANIZED HEALTH CARE EDUCATION/TRAINING PROGRAM
Payer: COMMERCIAL

## 2025-06-18 ENCOUNTER — APPOINTMENT (OUTPATIENT)
Dept: RADIOLOGY | Facility: HOSPITAL | Age: 60
End: 2025-06-18
Payer: COMMERCIAL

## 2025-06-18 ENCOUNTER — CLINICAL SUPPORT (OUTPATIENT)
Dept: EMERGENCY MEDICINE | Facility: HOSPITAL | Age: 60
End: 2025-06-18
Payer: COMMERCIAL

## 2025-06-18 DIAGNOSIS — R53.1 WEAKNESS: Primary | ICD-10-CM

## 2025-06-18 DIAGNOSIS — G25.2 COARSE TREMORS: ICD-10-CM

## 2025-06-18 DIAGNOSIS — G21.19 DRUG-INDUCED PARKINSONISM (MULTI): ICD-10-CM

## 2025-06-18 DIAGNOSIS — Z72.0 TOBACCO USE: ICD-10-CM

## 2025-06-18 PROCEDURE — 84484 ASSAY OF TROPONIN QUANT: CPT

## 2025-06-18 PROCEDURE — 82550 ASSAY OF CK (CPK): CPT

## 2025-06-18 PROCEDURE — 83880 ASSAY OF NATRIURETIC PEPTIDE: CPT

## 2025-06-18 PROCEDURE — 81001 URINALYSIS AUTO W/SCOPE: CPT

## 2025-06-18 PROCEDURE — 99285 EMERGENCY DEPT VISIT HI MDM: CPT | Performed by: EMERGENCY MEDICINE

## 2025-06-18 PROCEDURE — 83605 ASSAY OF LACTIC ACID: CPT

## 2025-06-18 PROCEDURE — 70450 CT HEAD/BRAIN W/O DYE: CPT

## 2025-06-18 PROCEDURE — 36415 COLL VENOUS BLD VENIPUNCTURE: CPT

## 2025-06-18 PROCEDURE — 71046 X-RAY EXAM CHEST 2 VIEWS: CPT | Performed by: RADIOLOGY

## 2025-06-18 PROCEDURE — 72125 CT NECK SPINE W/O DYE: CPT

## 2025-06-18 PROCEDURE — 71046 X-RAY EXAM CHEST 2 VIEWS: CPT

## 2025-06-18 PROCEDURE — 93005 ELECTROCARDIOGRAM TRACING: CPT

## 2025-06-18 PROCEDURE — 99285 EMERGENCY DEPT VISIT HI MDM: CPT | Mod: 25 | Performed by: EMERGENCY MEDICINE

## 2025-06-18 PROCEDURE — 84075 ASSAY ALKALINE PHOSPHATASE: CPT

## 2025-06-18 PROCEDURE — 85025 COMPLETE CBC W/AUTO DIFF WBC: CPT

## 2025-06-18 PROCEDURE — 83735 ASSAY OF MAGNESIUM: CPT

## 2025-06-18 ASSESSMENT — LIFESTYLE VARIABLES
EVER FELT BAD OR GUILTY ABOUT YOUR DRINKING: NO
HAVE YOU EVER FELT YOU SHOULD CUT DOWN ON YOUR DRINKING: NO
HAVE PEOPLE ANNOYED YOU BY CRITICIZING YOUR DRINKING: NO
EVER HAD A DRINK FIRST THING IN THE MORNING TO STEADY YOUR NERVES TO GET RID OF A HANGOVER: NO
TOTAL SCORE: 0

## 2025-06-18 ASSESSMENT — PAIN SCALES - GENERAL: PAINLEVEL_OUTOF10: 0 - NO PAIN

## 2025-06-18 ASSESSMENT — PAIN - FUNCTIONAL ASSESSMENT: PAIN_FUNCTIONAL_ASSESSMENT: 0-10

## 2025-06-19 ENCOUNTER — HOME HEALTH ADMISSION (OUTPATIENT)
Dept: HOME HEALTH SERVICES | Facility: HOME HEALTH | Age: 60
End: 2025-06-19
Payer: COMMERCIAL

## 2025-06-19 VITALS
TEMPERATURE: 98.1 F | RESPIRATION RATE: 18 BRPM | WEIGHT: 165 LBS | HEIGHT: 68 IN | SYSTOLIC BLOOD PRESSURE: 126 MMHG | BODY MASS INDEX: 25.01 KG/M2 | DIASTOLIC BLOOD PRESSURE: 75 MMHG | OXYGEN SATURATION: 97 % | HEART RATE: 69 BPM

## 2025-06-19 PROBLEM — R25.1 TREMOR: Status: ACTIVE | Noted: 2025-06-19

## 2025-06-19 PROBLEM — R25.1 TREMOR: Status: RESOLVED | Noted: 2025-06-19 | Resolved: 2025-06-19

## 2025-06-19 LAB
ALBUMIN SERPL BCP-MCNC: 4.2 G/DL (ref 3.4–5)
ALP SERPL-CCNC: 39 U/L (ref 33–136)
ALT SERPL W P-5'-P-CCNC: 3 U/L (ref 7–45)
ANION GAP SERPL CALC-SCNC: 9 MMOL/L (ref 10–20)
APPEARANCE UR: ABNORMAL
AST SERPL W P-5'-P-CCNC: 10 U/L (ref 9–39)
BASOPHILS # BLD AUTO: 0.02 X10*3/UL (ref 0–0.1)
BASOPHILS NFR BLD AUTO: 0.4 %
BILIRUB SERPL-MCNC: 0.4 MG/DL (ref 0–1.2)
BILIRUB UR STRIP.AUTO-MCNC: NEGATIVE MG/DL
BNP SERPL-MCNC: 93 PG/ML (ref 0–99)
BUN SERPL-MCNC: 6 MG/DL (ref 6–23)
CALCIUM SERPL-MCNC: 9.3 MG/DL (ref 8.6–10.6)
CARDIAC TROPONIN I PNL SERPL HS: <3 NG/L (ref 0–34)
CHLORIDE SERPL-SCNC: 104 MMOL/L (ref 98–107)
CK SERPL-CCNC: 102 U/L (ref 0–215)
CO2 SERPL-SCNC: 30 MMOL/L (ref 21–32)
COLOR UR: ABNORMAL
CREAT SERPL-MCNC: 0.62 MG/DL (ref 0.5–1.05)
EGFRCR SERPLBLD CKD-EPI 2021: >90 ML/MIN/1.73M*2
EOSINOPHIL # BLD AUTO: 0.17 X10*3/UL (ref 0–0.7)
EOSINOPHIL NFR BLD AUTO: 3.3 %
ERYTHROCYTE [DISTWIDTH] IN BLOOD BY AUTOMATED COUNT: 13.6 % (ref 11.5–14.5)
GLUCOSE SERPL-MCNC: 107 MG/DL (ref 74–99)
GLUCOSE UR STRIP.AUTO-MCNC: NORMAL MG/DL
HCT VFR BLD AUTO: 34.1 % (ref 36–46)
HGB BLD-MCNC: 12 G/DL (ref 12–16)
IMM GRANULOCYTES # BLD AUTO: 0.01 X10*3/UL (ref 0–0.7)
IMM GRANULOCYTES NFR BLD AUTO: 0.2 % (ref 0–0.9)
KETONES UR STRIP.AUTO-MCNC: NEGATIVE MG/DL
LACTATE SERPL-SCNC: 1 MMOL/L (ref 0.4–2)
LEUKOCYTE ESTERASE UR QL STRIP.AUTO: ABNORMAL
LYMPHOCYTES # BLD AUTO: 2.51 X10*3/UL (ref 1.2–4.8)
LYMPHOCYTES NFR BLD AUTO: 49.1 %
MAGNESIUM SERPL-MCNC: 2.04 MG/DL (ref 1.6–2.4)
MCH RBC QN AUTO: 28.6 PG (ref 26–34)
MCHC RBC AUTO-ENTMCNC: 35.2 G/DL (ref 32–36)
MCV RBC AUTO: 81 FL (ref 80–100)
MONOCYTES # BLD AUTO: 0.54 X10*3/UL (ref 0.1–1)
MONOCYTES NFR BLD AUTO: 10.6 %
MUCOUS THREADS #/AREA URNS AUTO: ABNORMAL /LPF
NEUTROPHILS # BLD AUTO: 1.86 X10*3/UL (ref 1.2–7.7)
NEUTROPHILS NFR BLD AUTO: 36.4 %
NITRITE UR QL STRIP.AUTO: NEGATIVE
NRBC BLD-RTO: 0 /100 WBCS (ref 0–0)
PH UR STRIP.AUTO: 7 [PH]
PLATELET # BLD AUTO: 163 X10*3/UL (ref 150–450)
POTASSIUM SERPL-SCNC: 3.7 MMOL/L (ref 3.5–5.3)
PROT SERPL-MCNC: 6.9 G/DL (ref 6.4–8.2)
PROT UR STRIP.AUTO-MCNC: NEGATIVE MG/DL
RBC # BLD AUTO: 4.19 X10*6/UL (ref 4–5.2)
RBC # UR STRIP.AUTO: NEGATIVE MG/DL
RBC #/AREA URNS AUTO: ABNORMAL /HPF
SODIUM SERPL-SCNC: 139 MMOL/L (ref 136–145)
SP GR UR STRIP.AUTO: 1.01
SQUAMOUS #/AREA URNS AUTO: ABNORMAL /HPF
UROBILINOGEN UR STRIP.AUTO-MCNC: NORMAL MG/DL
WBC # BLD AUTO: 5.1 X10*3/UL (ref 4.4–11.3)
WBC #/AREA URNS AUTO: ABNORMAL /HPF

## 2025-06-19 PROCEDURE — 2500000001 HC RX 250 WO HCPCS SELF ADMINISTERED DRUGS (ALT 637 FOR MEDICARE OP): Mod: SE | Performed by: STUDENT IN AN ORGANIZED HEALTH CARE EDUCATION/TRAINING PROGRAM

## 2025-06-19 PROCEDURE — S4991 NICOTINE PATCH NONLEGEND: HCPCS | Mod: SE

## 2025-06-19 PROCEDURE — 72125 CT NECK SPINE W/O DYE: CPT | Performed by: RADIOLOGY

## 2025-06-19 PROCEDURE — 70450 CT HEAD/BRAIN W/O DYE: CPT | Performed by: RADIOLOGY

## 2025-06-19 PROCEDURE — 99255 IP/OBS CONSLTJ NEW/EST HI 80: CPT | Performed by: PSYCHIATRY & NEUROLOGY

## 2025-06-19 PROCEDURE — G0378 HOSPITAL OBSERVATION PER HR: HCPCS

## 2025-06-19 PROCEDURE — 97161 PT EVAL LOW COMPLEX 20 MIN: CPT | Mod: GP

## 2025-06-19 PROCEDURE — 2500000002 HC RX 250 W HCPCS SELF ADMINISTERED DRUGS (ALT 637 FOR MEDICARE OP, ALT 636 FOR OP/ED): Mod: SE | Performed by: STUDENT IN AN ORGANIZED HEALTH CARE EDUCATION/TRAINING PROGRAM

## 2025-06-19 PROCEDURE — 2500000001 HC RX 250 WO HCPCS SELF ADMINISTERED DRUGS (ALT 637 FOR MEDICARE OP): Mod: SE

## 2025-06-19 PROCEDURE — 97116 GAIT TRAINING THERAPY: CPT | Mod: GP

## 2025-06-19 PROCEDURE — 99239 HOSP IP/OBS DSCHRG MGMT >30: CPT | Performed by: STUDENT IN AN ORGANIZED HEALTH CARE EDUCATION/TRAINING PROGRAM

## 2025-06-19 PROCEDURE — 99406 BEHAV CHNG SMOKING 3-10 MIN: CPT | Performed by: STUDENT IN AN ORGANIZED HEALTH CARE EDUCATION/TRAINING PROGRAM

## 2025-06-19 PROCEDURE — 96372 THER/PROPH/DIAG INJ SC/IM: CPT

## 2025-06-19 PROCEDURE — 99243 OFF/OP CNSLTJ NEW/EST LOW 30: CPT

## 2025-06-19 PROCEDURE — 2500000004 HC RX 250 GENERAL PHARMACY W/ HCPCS (ALT 636 FOR OP/ED): Mod: SE

## 2025-06-19 PROCEDURE — 2500000002 HC RX 250 W HCPCS SELF ADMINISTERED DRUGS (ALT 637 FOR MEDICARE OP, ALT 636 FOR OP/ED): Mod: SE

## 2025-06-19 RX ORDER — DOCUSATE SODIUM 100 MG/1
100 CAPSULE, LIQUID FILLED ORAL 2 TIMES DAILY
Status: DISCONTINUED | OUTPATIENT
Start: 2025-06-19 | End: 2025-06-19 | Stop reason: HOSPADM

## 2025-06-19 RX ORDER — CHOLECALCIFEROL (VITAMIN D3) 25 MCG
25 TABLET ORAL DAILY
Status: DISCONTINUED | OUTPATIENT
Start: 2025-06-19 | End: 2025-06-19 | Stop reason: HOSPADM

## 2025-06-19 RX ORDER — MICONAZOLE NITRATE 2 %
2 CREAM (GRAM) TOPICAL EVERY 2 HOUR PRN
Status: DISCONTINUED | OUTPATIENT
Start: 2025-06-19 | End: 2025-06-19 | Stop reason: HOSPADM

## 2025-06-19 RX ORDER — RISPERIDONE 120 MG
KIT SUBCUTANEOUS
COMMUNITY
Start: 2025-06-18

## 2025-06-19 RX ORDER — CARBIDOPA AND LEVODOPA 25; 100 MG/1; MG/1
1 TABLET ORAL 3 TIMES DAILY
COMMUNITY
Start: 2025-03-07

## 2025-06-19 RX ORDER — TRIHEXYPHENIDYL HYDROCHLORIDE 2 MG/1
3 TABLET ORAL
Status: DISCONTINUED | OUTPATIENT
Start: 2025-06-19 | End: 2025-06-19 | Stop reason: HOSPADM

## 2025-06-19 RX ORDER — DIVALPROEX SODIUM 250 MG/1
500 TABLET, FILM COATED, EXTENDED RELEASE ORAL DAILY
Status: DISCONTINUED | OUTPATIENT
Start: 2025-06-19 | End: 2025-06-19 | Stop reason: HOSPADM

## 2025-06-19 RX ORDER — ENOXAPARIN SODIUM 100 MG/ML
40 INJECTION SUBCUTANEOUS EVERY 24 HOURS
Status: DISCONTINUED | OUTPATIENT
Start: 2025-06-19 | End: 2025-06-19 | Stop reason: HOSPADM

## 2025-06-19 RX ORDER — CARBIDOPA AND LEVODOPA 25; 100 MG/1; MG/1
1 TABLET ORAL 3 TIMES DAILY
Status: DISCONTINUED | OUTPATIENT
Start: 2025-06-19 | End: 2025-06-19 | Stop reason: HOSPADM

## 2025-06-19 RX ORDER — ESCITALOPRAM OXALATE 10 MG/1
20 TABLET ORAL DAILY
Status: DISCONTINUED | OUTPATIENT
Start: 2025-06-19 | End: 2025-06-19 | Stop reason: HOSPADM

## 2025-06-19 RX ORDER — DIVALPROEX SODIUM 250 MG/1
500 TABLET, FILM COATED, EXTENDED RELEASE ORAL NIGHTLY
Status: DISCONTINUED | OUTPATIENT
Start: 2025-06-19 | End: 2025-06-19

## 2025-06-19 RX ORDER — DOCUSATE SODIUM 100 MG/1
100 CAPSULE, LIQUID FILLED ORAL 2 TIMES DAILY
COMMUNITY
Start: 2017-12-05 | End: 2025-06-19 | Stop reason: WASHOUT

## 2025-06-19 RX ORDER — QUETIAPINE FUMARATE 100 MG/1
100 TABLET, FILM COATED ORAL NIGHTLY
Status: DISCONTINUED | OUTPATIENT
Start: 2025-06-19 | End: 2025-06-19

## 2025-06-19 RX ORDER — CHOLECALCIFEROL (VITAMIN D3) 25 MCG
25 TABLET ORAL
COMMUNITY
End: 2025-06-19 | Stop reason: WASHOUT

## 2025-06-19 RX ORDER — LAMOTRIGINE 100 MG/1
100 TABLET ORAL 2 TIMES DAILY
Status: DISCONTINUED | OUTPATIENT
Start: 2025-06-19 | End: 2025-06-19 | Stop reason: HOSPADM

## 2025-06-19 RX ORDER — PROPRANOLOL HYDROCHLORIDE 20 MG/1
20 TABLET ORAL 3 TIMES DAILY
Status: DISCONTINUED | OUTPATIENT
Start: 2025-06-19 | End: 2025-06-19 | Stop reason: HOSPADM

## 2025-06-19 RX ORDER — POLYETHYLENE GLYCOL 3350 17 G/17G
17 POWDER, FOR SOLUTION ORAL DAILY
Status: DISCONTINUED | OUTPATIENT
Start: 2025-06-19 | End: 2025-06-19 | Stop reason: HOSPADM

## 2025-06-19 RX ORDER — TRIHEXYPHENIDYL HYDROCHLORIDE 2 MG/1
3 TABLET ORAL 3 TIMES DAILY
COMMUNITY
Start: 2025-04-07

## 2025-06-19 RX ORDER — IBUPROFEN 200 MG
1 TABLET ORAL EVERY 24 HOURS
Status: DISCONTINUED | OUTPATIENT
Start: 2025-06-19 | End: 2025-06-19 | Stop reason: HOSPADM

## 2025-06-19 RX ORDER — IBUPROFEN 200 MG
1 TABLET ORAL EVERY 24 HOURS
COMMUNITY
Start: 2024-07-23 | End: 2025-06-19 | Stop reason: WASHOUT

## 2025-06-19 RX ORDER — PROPRANOLOL HYDROCHLORIDE 20 MG/1
20 TABLET ORAL 3 TIMES DAILY
COMMUNITY
Start: 2024-09-12 | End: 2025-09-07

## 2025-06-19 RX ORDER — DIVALPROEX SODIUM 500 MG/1
500 TABLET, FILM COATED, EXTENDED RELEASE ORAL DAILY
COMMUNITY
Start: 2024-01-17

## 2025-06-19 RX ORDER — ESCITALOPRAM OXALATE 20 MG/1
10 TABLET ORAL DAILY
COMMUNITY
Start: 2024-12-10

## 2025-06-19 RX ORDER — MULTIVITAMIN WITH FOLIC ACID 400 MCG
1 TABLET ORAL
COMMUNITY
Start: 2024-11-18 | End: 2025-11-18

## 2025-06-19 RX ORDER — LATANOPROST 50 UG/ML
1 SOLUTION/ DROPS OPHTHALMIC NIGHTLY
Status: DISCONTINUED | OUTPATIENT
Start: 2025-06-19 | End: 2025-06-19 | Stop reason: HOSPADM

## 2025-06-19 RX ORDER — LATANOPROST 50 UG/ML
1 SOLUTION/ DROPS OPHTHALMIC NIGHTLY
COMMUNITY
Start: 2025-06-18 | End: 2026-06-13

## 2025-06-19 RX ORDER — QUETIAPINE FUMARATE 100 MG/1
50 TABLET, FILM COATED ORAL NIGHTLY
COMMUNITY
Start: 2025-03-10

## 2025-06-19 RX ADMIN — TRIHEXYPHENIDYL HYDROCHLORIDE 3 MG: 2 TABLET ORAL at 16:22

## 2025-06-19 RX ADMIN — CARBIDOPA AND LEVODOPA 1 TABLET: 25; 100 TABLET ORAL at 08:11

## 2025-06-19 RX ADMIN — ENOXAPARIN SODIUM 40 MG: 100 INJECTION SUBCUTANEOUS at 08:11

## 2025-06-19 RX ADMIN — TRIHEXYPHENIDYL HYDROCHLORIDE 3 MG: 2 TABLET ORAL at 11:15

## 2025-06-19 RX ADMIN — PROPRANOLOL HYDROCHLORIDE 20 MG: 20 TABLET ORAL at 14:20

## 2025-06-19 RX ADMIN — CARBIDOPA AND LEVODOPA 1 TABLET: 25; 100 TABLET ORAL at 14:20

## 2025-06-19 RX ADMIN — TRIHEXYPHENIDYL HYDROCHLORIDE 3 MG: 2 TABLET ORAL at 08:42

## 2025-06-19 RX ADMIN — NICOTINE 1 PATCH: 14 PATCH, EXTENDED RELEASE TRANSDERMAL at 08:16

## 2025-06-19 RX ADMIN — LAMOTRIGINE 100 MG: 100 TABLET ORAL at 08:11

## 2025-06-19 RX ADMIN — PROPRANOLOL HYDROCHLORIDE 20 MG: 20 TABLET ORAL at 08:11

## 2025-06-19 RX ADMIN — ESCITALOPRAM OXALATE 20 MG: 10 TABLET ORAL at 08:11

## 2025-06-19 RX ADMIN — Medication 25 MCG: at 08:11

## 2025-06-19 RX ADMIN — DIVALPROEX SODIUM 500 MG: 250 TABLET, EXTENDED RELEASE ORAL at 14:18

## 2025-06-19 ASSESSMENT — COGNITIVE AND FUNCTIONAL STATUS - GENERAL
MOVING FROM LYING ON BACK TO SITTING ON SIDE OF FLAT BED WITH BEDRAILS: A LITTLE
MOVING TO AND FROM BED TO CHAIR: A LITTLE
CLIMB 3 TO 5 STEPS WITH RAILING: TOTAL
TURNING FROM BACK TO SIDE WHILE IN FLAT BAD: A LITTLE
MOBILITY SCORE: 16
WALKING IN HOSPITAL ROOM: A LITTLE
STANDING UP FROM CHAIR USING ARMS: A LITTLE

## 2025-06-19 ASSESSMENT — PAIN - FUNCTIONAL ASSESSMENT: PAIN_FUNCTIONAL_ASSESSMENT: 0-10

## 2025-06-19 ASSESSMENT — PAIN SCALES - GENERAL
PAINLEVEL_OUTOF10: 0 - NO PAIN
PAINLEVEL_OUTOF10: 0 - NO PAIN

## 2025-06-19 NOTE — ED PROVIDER NOTES
History of Present Illness     History provided by: Patient  Limitations to History: None  External Records Reviewed with Brief Summary: Reviewed ED note from 2/19/2025 in which patient was seen for bilateral hand and feet numbness tingling with bodyaches,Per Dr. Marquis neurology visit on 11/18/24:   IMPRESSION: 1) parknisonism. Drug-induced vs PD. Poor response to artane    HPI:  Alesia Fernandez is a 59-year-old female history of schizophrenia/schizoaffective disorder on Invega injection, partial epilepsy, drug-induced parkinsonism presenting to the ED for evaluation of lower extremity tremors and weakness.  Patient states she has history of seizure disorder involving tongue biting and lip smacking, also has history of tremors believed secondary to a hypoxic event as a child, states she has had these tremors for over 25 years, no known breakthrough seizures and is compliant on Depakote, also gets Invega injections which she received today for her psychiatric disorder.  She notes that today she felt increasingly weak and was having difficulty walking, she does note a few falls within the last month and has struck her head.  She otherwise denies any chest pain, shortness of breath, cough congestion, does have some intermittent difficulty with urination but no hematuria or frequency, no loss of bowel or bladder.  No fever or chills.      Physical Exam   Triage vitals:  T 36.9 °C (98.4 °F)  HR 82  /86  RR 18  O2 94 %      General: Awake, alert, in no acute distress, non-toxic appearing  Eyes: Gaze conjugate.  No scleral icterus or injection  HENT: Normo-cephalic, atraumatic. No stridor.  No tongue abrasions  CV: Regular rate, regular rhythm. No MRG.  Radial pulse 2+ bilaterally, DP pulse 2+ bilaterally  Resp: Breathing non-labored, clear to auscultation bilaterally, no accessory muscle use  GI: Soft, non-distended, non-tender. No rebound or guarding.  Old healed surgical abdominal scars.  No palpable  mass.  MSK/Extremities: No gross bony deformities.  No lower extremity edema or tenderness.  Moving all extremities, Rhythmic resting jerking/tremors to the bilateral upper extremities and head improved with intention.  Skin: Warm. Appropriate color  Neuro: Awake and Alert. Face symmetric. Appropriate tone. Moving all extremities equally.  Strength 5 out of 5 bilateral upper and lower extremities, no drift, sensation intact bilateral upper and lower extremities.    Medical Decision Making & ED Course   Medical Decision Makin y.o. female history of schizophrenia/schizoaffective disorder, partial epilepsy, presumed drug-induced parkinsonism presenting to the ED for worsening/new tremors involving the lower extremities as well as worsening of the bilateral upper extremity tremors, has resulted in multiple recent falls.  Also with some subjective weakness, patient afebrile nontoxic-appearing, does have significant resting tremors involving the bilateral upper extremities and head with rhythmic jerking motions she otherwise has no lateral weakness or sensory changes with low suspicion for CVA, she has had a few recent falls with head strike so considered ICH in the setting of worsening tremors, also consider worsening symptoms secondary to antipsychotic drug use.  From psychiatric standpoint patient is calm cooperative and does not show signs of acute decompensation, did get Invega injection earlier today.  Will plan to get basic labs to rule out any significant metabolic derangements, will get CK to rule out any muscular disturbances, will also get CT imaging of the head and cervical spine to rule out bleed or fracture.  Basic medical clearance labs to additionally be obtained given generalized weakness will get ECG and troponin to rule out atypical ACS although suspicion overall low.  Chest x-ray showing no acute infectious process, no acute findings.  Will additionally get urinalysis for completion of infectious  workup as source of worsening weakness.  Due to patient's recurrent falls, had discussion and she currently does not feel safe for discharge home due to this progressive weakness and tremoring, feels that she would benefit from evaluation for potential assistance devices or additional help at home therefore we will plan to admit for PT/OT, however was signed out to oncoming provider pending final results of labs and imaging.  ----       Social Determinants of Health which Significantly Impact Care: Difficulty obtaining outpatient follow-up     EKG Independent Interpretation: See ED course for my independent interpretation if ECG was obtained.    Independent Result Review and Interpretation: Please see MDM and ED course for my independent interpretation of the results    Chronic conditions affecting the patient's care: Please see H&P and MDM    The patient was discussed with the following consultants/services: None    Care Considerations: As document above in [facility]    ED Course:  Diagnoses as of 06/18/25 2157   Weakness   Coarse tremors     Disposition   Patient was signed out to oncoming provider at 2300 pending completion of their work-up.  Please see the next provider's transition of care note for the remainder of the patient's care.     Procedures   Procedures    Patient seen and discussed with attending physician    Robyn Linares DO  Emergency Medicine     Robyn Linares DO  Resident  06/19/25 0016

## 2025-06-19 NOTE — DISCHARGE SUMMARY
Date of Admission: 6/18/2025    Date of Discharge: 6/19/2025    Discharge Diagnosis  Drug induced parkinsonism  Impaired mobility and ADLs  Abnormal UA  Functional neurological disorder       Discharge Meds     Your medication list        CONTINUE taking these medications        Instructions Last Dose Given Next Dose Due   carbidopa-levodopa  mg tablet  Commonly known as: Sinemet           Daily-Venita (with folic acid) 400 mcg tablet  Generic drug: multivitamin           divalproex 500 mg 24 hr tablet  Commonly known as: Depakote ER           escitalopram 20 mg tablet  Commonly known as: Lexapro           lamoTRIgine 100 mg tablet  Commonly known as: LaMICtal           latanoprost 0.005 % ophthalmic solution  Commonly known as: Xalatan           propranolol 20 mg tablet  Commonly known as: Inderal           QUEtiapine 100 mg tablet  Commonly known as: SEROquel           Perseris 120 mg suspension,extended rel syring subcutaneous injection  Generic drug: risperiDONE           trihexyphenidyl 2 mg tablet  Commonly known as: Artane           VITAMIN E ACETATE ORAL                    Test Results Pending At Discharge  Pending Labs       No current pending labs.            Hospital Course  Alesia Fernandez is a 59-year-old female history of schizophrenia/schizoaffective disorder on Invega injection, partial epilepsy, drug-induced parkinsonism who presented for  increased involuntary movements/tremors, acute on chronic. Also feelng weakness needing help with ADLs.Ct head, c-spine, no acute abnormalities in ed,  admitted to medicine. Labs stable except for abnormal UA, + LE and 11-20 wbc but pt is denying any urinary symptoms at this time to correlate with infection. Pt seen in the am after admission and reported significant improvement in symptoms. Pt was seen by psychiatry, recommended initial changes and neurology evaluation, neurology suspected parkinsonism likely features and functional weakness, that was  intermittent, not present consistently throughout testing, as well there may be a functional component to the tremor as it was distractable. Discussed with psych, to continue home medications as she had recent adjustments, no further work up at this time per neuro. Pt reported psych follow up on Monday 6/23. She was recommended mod intensity therapy but felt she had functionally improved, had sufficient help at home and elected for home care for therapy. Pt seen in the am and then in the evening, overall has significant improvement on subsequent exam, no noted tremulous activity at time of second encounter. Confirm home meds ok for dc with psych.  Pt not using nicotine supplementation, currently actively smoking, referred to nicotine cesation clinc, 4 mins spent with pt, cancelled supplementation while actively smoking. Pt arranged home care and confirmed. Pt report significant help from 5 children at home, advised if any concerns for caring for self or safety at home she should seek medical eval.. Discharge plan of care discussed, education and counseling provided involving active problem care plan, warning signs,  risks/benefits of new medications or medication changes, follow-up care and testing.  Patient advised to follow-up with her primary care within 1 week of discharge or the next available for posthospitalization transition of care.  There was verbalized understanding and agreement with discharge care plan.    Pertinent Physical Exam At Time of Discharge  On the day of discharge, No acute distress, interactive, no increased work of breathing, regular rate and rhythm, abdomen soft/nontender, no edema, no focal weakness noted,rhythmic tremors of arm, body face appear improved.    Outpatient Follow-Up  No future appointments.    PCP and psychiatry needed.     Pt instructed to take all medications as prescribed.  Keep all follow-up appointments.  Contact their primary care physician with any questions or  concerns that arise.  Come to the emergency department with worsening of your symptoms or any other medical emergency. Instructed that any outpatient tests that are ordered for outpatient follow up are meant to expedite outpatient work up and management, and that the results are not followed or managed by the inpatient ordering team and MUST be followed up with the outpatient primary care or outpatient specialist.  Verbal understanding and agreement obtained to order tests for outpatient follow up purposes.       Time spent >30 minutes on discharge management.    Severino Rodrigues MD

## 2025-06-19 NOTE — CONSULTS
"Consults    Reason for Consult: Evaluation of worsening tremors in setting of drug-induced parkinsonism    History Of Present Illness  This is a 59-year-old woman with schizoaffective disorder bipolar type, drug-induced parkinsonism, and remote traumatic brain injury who presents with acute functional decline following a \"shaking episode\" yesterday.    The patient proved to be a limited historian with intermittent tangential responses and internal inconsistencies in her narrative. Critically, collateral information (obtained from EMR) reveals the patient was ambulatory without assistive devices until yesterday when she experienced what was described as a \"shaking episode,\" after which she has required a walker for ambulation. This represents an acute change in functional status.    She initially reported that her tremors (hands and head) have been present \"infrequently\" since a hypoxic event 25 years prior. When specifically queried whether symptoms worsened following her paliperidone palmitate injection yesterday, she initially denied this, then subsequently endorsed possible temporal relationship.    Regarding her presenting complaint of weakness, the patient's account was notably inconsistent. She initially denied leg weakness, then when asked about laterality, endorsed left greater than right weakness, though could not specify onset timing.    Review of systems notable for: Endorsed auditory hallucinations (non-command type). Denied homicidal or suicidal ideation. Denied fever, chills, nausea, vomiting, new sensory changes including numbness or tingling.     Past Medical History  Medical History[1]  Surgical History  Surgical History[2]  Social History  Social History[3]  Allergies  Patient has no known allergies.  Prescriptions Prior to Admission[4]    Review of Systems  Neurological Exam  Physical Exam  NEUROLOGICAL EXAMINATION:    General Appearance:  No distress, alert, interactive and cooperative.     " "  Mental State: Orientation was normal to time, place and person. Recent and remote memory was intact.  Attention span and concentration were normal. Language testing was normal for comprehension, repetition, expression, and naming. Incorrectly spelled WORLD backwards, said \"DWOR\"  Cranial Nerves:   CN: Visual fields full to confrontation.   CN 3, 4, 6: Pupils round, 4 mm in diameter, equally reactive to light. Lids symmetric; no ptosis. EOMs normal alignment, full range with normal saccades, pursuit and convergence.   No nystagmus.   CN 5: Facial sensation intact bilaterally.   CN 7: Normal and symmetric facial strength. Nasolabial folds symmetric.   CN 8: Hearing intact to voice  CN 9: Palate elevates symmetrically.   CN 11: Normal strength of shoulder shrug and neck turning.   CN 12: Tongue midline, with normal bulk and strength; no fasciculations.      Motor: Normal muscle bulk, Jaw tremor present, distractible high amplitude upper extremity shaking     Strength                                             R          L  Shoulder abduction (C4-C5)              5 5  Elbow flexion (C5-C6)                          5          5  Elbow extension (C7-C8)                     5          5                                                        5 5        Hip flexion (IP, L2-L3)                          4->5 4->5*  Knee extension (Fem, L2-L4)               4->5 4->5  Knee flexion (Sci, L5-S1)                      4->5 4->5  DorsiFlex (DePer, L5-S1l)                     5          5  PlantarFlex (Tibial, S1-S2)                    5          5    *all strength deficits resolved on re-examination     Reflexes: Right/ Left:  Biceps 2/2, brachioradialis 2/2, triceps 2/2, patellar 0/0, ankle 0/0  toes downgoing to plantar stimulation. No clonus, frontal release signs or other pathologic reflexes present.      Sensory: In both upper and lower extremities, sensation was intact to light touch, temperature and vibration   " "  Coordination: In both upper extremities, finger-nose-finger was intact without dysmetria or overshoot. In both lower extremities, heel-to-shin was intact.       Gait:  Narrow base, brisk, normal arm swing, able to rise from sitting to standing position un assisted     Last Recorded Vitals  Blood pressure 124/75, pulse 68, temperature 36.3 °C (97.3 °F), temperature source Oral, resp. rate 16, height 1.727 m (5' 8\"), weight 74.8 kg (165 lb), SpO2 95%.    Relevant Results                    Lillie Coma Scale  Best Eye Response: Spontaneous  Best Verbal Response: Oriented  Best Motor Response: Follows commands  Kell Coma Scale Score: 15                 I have personally reviewed the following imaging results:   Imaging  CT head wo IV contrast  Result Date: 6/19/2025  CT HEAD: 1. No acute intracranial hemorrhage or mass effect.       CT CERVICAL SPINE: 1. No acute fracture or traumatic malalignment of the cervical spine. 2. Multilevel degenerative changes as detailed above liver moderate canal narrowing and significant foraminal stenosis.     I personally reviewed the images/study and resident's interpretation and I agree with the findings as stated by Josie Fisher MD (resident radiologist). This study was analyzed and interpreted at [place], [place].   MACRO: None.   Signed by: Alyson Sevilla 6/19/2025 12:42 AM Dictation workstation:   [id number]    CT cervical spine wo IV contrast  Result Date: 6/19/2025  CT HEAD: 1. No acute intracranial hemorrhage or mass effect.       CT CERVICAL SPINE: 1. No acute fracture or traumatic malalignment of the cervical spine. 2. Multilevel degenerative changes as detailed above liver moderate canal narrowing and significant foraminal stenosis.     I personally reviewed the images/study and resident's interpretation and I agree with the findings as stated by Josie Fisher MD (resident radiologist). This study was analyzed and " interpreted at [place], [place].   MACRO: None.   Signed by: Alyson Sevilla 6/19/2025 12:42 AM Dictation workstation:   [id number]    XR chest 2 views  Result Date: 6/18/2025  1.  No evidence of acute cardiopulmonary process. 2. Changes consistent with chronic lung disease.   I personally reviewed the images/study and resident's interpretation and I agree with the findings as stated by Josie Fisher MD (resident radiologist). This study was analyzed and interpreted at [place], [place].   MACRO: None   Signed by: Alyson Sevilla 6/18/2025 10:45 PM Dictation workstation:   [id number]      Cardiology, Vascular, and Other Imaging  No other imaging results found for the past 7 days      Assessment/Plan   Assessment & Plan  Weakness    Coarse tremors    This 59-year-old woman with schizoaffective disorder presents admitted for worsening lower extremity weakness and worsening movements. Neurology consulted for management of drug induced parkinsonism. On exam she has evidence of mild resting jaw tremor worse with action. Her neurological exam is otherwise intact. Of note she does demonstrate a suppressible bilateral high frequency and high amplitude non sterotyped upper extremity tremor that appears voluntary in nature as well as fluctuating weakness (ex on initial exam gait was magnetic, narrow based, unsteady) then 20 minutes later on repeat exam gait was brisk and steady. The weakness and volitional shaking likely have a functional component, however agree that the low amplitude jaw tremor and slight RUE tremor is likely related to  her antipsychotic use, and agree that current management is appropriate. No further changes are required.     Recommendations:  -Continue current anticholinergic therapy (trihexyphenidyl 3mg TID)  - Sinemet per patient's outpatient neurologist      No further neurological workup required, neurology  will signoff.     Raciel Haq MD      I personally saw, examined, and discussed the patient above. I have reviewed and agree with the excellent  note above. All edits or corrections have been made directly into the note, including the physical exam and assessment/plan. Patient was seen, examined, and discussed with the attending.    Jesusita Reeves MD  Department of Neurology, PGY-3  General [id number]              [1] No past medical history on file.  [2] No past surgical history on file.  [3]    [4] (Not in a hospital admission)

## 2025-06-19 NOTE — CONSULTS
HISTORY OF PRESENT ILLNESS:  Alesia Fernandez is a 59 y.o. female with a past psychiatric history of schizoaffective disorder, and a past medical history of drug induced parkinsonism, partial epilepsy who was admitted to [facility] on 6/18 for lower extremity weakness and tremor. Psychiatry was consulted on 6/19 for concern for EPS reaction.    On chart review,   per ED note: History of seizure disorder involving tongue biting and lip smacking, and a history of tremors believed secondary to a hypoxic event as a child. Tremors have been present for 25 years. She has no known breakthrough seizures and is compliant on Depakote. Since yesterday she has had weakness in her legs, particularly when walking. She denies any falls. Denied any numbness/tingling or pain, no recent illness.     Pt seen briefly by  psychiatry service 3 years ago and 1 year ago for anxiety and agitation.     On interview: pt relates that she is feeling better today, less tremors. Relates she felt fine yesterday after receiving perseris injection. Didn't start to notice changes until the evening post ALVAREZ. Pt relates her perseris injection was not changed, and she has been on this for about two years. Pt relates she has been on many antipsychotics in the past. Endorses chronic R hand tremor with rhythmic jerking, hx of other tremors particularly with her head shaking side to side. Pt relates head shaking started last night which alarmed pt, and at this time she felt LE weakness bilat. No concern for seizure, no tongue biting, post ictal confusion, incontinence prior to admission. Pt denies SI, but reports some sadness/frustration with her chronic tremors and hx of schizophrenia. Endorses AH at home and mildly in the ED, some paranoia at home as well. No overt delusions, disorganization seen.     Pt daughter Mary Beth called for collateral [phone]: she relates she is pt's biggest support and manages much of pts health including transport to apts,  managing medications, etc. Corroborates above, pt has been on perseris for a couple of years with no recent changes. Reports pt with baseline psychotic sx, which she thinks could have worsened recently which could explain tremors. She explains when pt decompensates psychiatrically,  her tremors worsen. Relates pt tremors have not improved with sinemet added in the past year or two.     MEDICAL REVIEW OF SYSTEMS  Review of Systems   +weakness bilat UE and LE, tremor.     PSYCHIATRIC REVIEW OF SYSTEMS  Depression: feelings of hopelessness  Anxiety: worries of looming dangers/catastrophes  Imelda: negative  Psychosis: auditory hallucinations:banging, paranoia, and loosening of associations  Delirium: lethargy and fatigue and increased or decreased psychomotor activity   Trauma: negative    PSYCHIATRIC HISTORY  Prior diagnoses: schizoaffective disorder, and a past medical history of drug induced parkinsonism, partial epilepsy   Prior hospitalizations: Extensive hx per previous notes, including [facility] 6/2023, 3/2023; [facility] 7/2022, 5/2022; [facility] 7/2021; [facility] 5/2021; [facility] 9/2016; [facility] 6/2016; [facility] 5/2016; [facility] 9/2015, 8/2015; [facility] 8/2015, 7/2015; [facility] 5/2015; [facility] 6/2014; [facility] 6/2014; [facility] 3/2014; [facility] 3/2014; [facility] 9/2013; multiple prior.     History of suicide attempts: denies. Per chart- hx of suicide attempts via OD on pills, OD on drugs, and bleach ingestion.   History of self-harm: denies  History of trauma/abuse/loss: unknown   History of violence: previous agitation while medically admitted    Current psychiatrist: Dr. Lance    Current mental health agency: [facility]   Current : unknown   Guardian or payee: none      Current psychiatric medications: Trazodone 100mg PO at bedtime, Lamictal 100mg PO BID, VPA 500mg in the AM, Lexapro 20mg. Perseris 120mg IM ALVAREZ q4 weeks last given 6/18  Past psychiatric  "medications: Invega Sustenna 156 mg/mL  EVERY 4 WEEKS, Invega Sustenna 234 mg, intramuscular, Every 30 days.  IM Haldol, Cogentin, hydroxyzine, Zoloft, Prozac, Lithium, and Zyprexa.  ARIPiprazole ER (ABILIFY MAINTENA), 400 mg, intramuscular.Seroquel 100 mg, Nightly  Family psychiatric history: unknown        SUBSTANCE USE HISTORY   She has no history on file for tobacco use, alcohol use, and drug use.    Tobacco: denies   Alcohol: denies, hx of heavier use over 30 years ago   Cannabis: denies   Other substances: denies, hx of cocaine use over 30 years ago       SOCIAL HISTORY  Social History[1]   Current living situation: by self in apt.   Current employment/source of income: unknown   Current stressors: medical/psychiatric illness     PAST MEDICAL HISTORY  Medical History[2]     PAST SURGICAL HISTORY  Surgical History[3]       FAMILY HISTORY  Family History[4]     ALLERGIES  Quetiapine      OARRS REVIEW  OARRS checked: yes   OARRS comments: score 250, hx of clonazepam 0.5mg tablet     OBJECTIVE    VITALS      11/14/2023    11:36 AM 7/19/2024     4:31 PM 7/20/2024     1:35 AM 6/18/2025     9:00 PM 6/19/2025    12:32 AM 6/19/2025     4:02 AM 6/19/2025     8:18 AM   Vitals   Systolic 123 182 133 135 141 151 118   Diastolic 68 106 70 86 80 80 76   BP Location Right arm Right leg   Right arm Right arm    Heart Rate 84 78 70 82 68 70 74   Temp 36.6 °C (97.9 °F) 36.9 °C (98.4 °F) 36.5 °C (97.7 °F) 36.9 °C (98.4 °F) 36.5 °C (97.7 °F) 36.8 °C (98.2 °F) 36.5 °C (97.7 °F)   Resp 16 16 16 18 18 17    Height  1.727 m (5' 8\")        Weight (lb)  165        BMI  25.09 kg/m2        BSA (m2)  1.89 m2             MENTAL STATUS EXAM  Appearance: AA female, thin, hygiene fair, lying down in bed in ED hallway   Attitude: Calm, cooperative, and engaged in conversation.  Behavior: Appropriate eye contact. No aggressive or agitated behavior.  Motor Activity: No psychomotor agitation or retardation. Abnormal movements noted, pt with " "rhythmic tremor in R hand, minimal general rhythmic tremor most pronounced in bilat legs, head. No inducible clonus. No evidence of extrapyramidal symptoms or tardive dyskinesia.   Speech: Regular rate, volume, tone, and quantity. Spontaneous, clear, coherent. No pressured speech.  Mood: \" feeling better \"  Affect: euthymic, normal range and intensity.   Thought Process: mostly Linear, logical, and goal-directed.   Thought Content:  Does not endorse suicidal or homicidal ideation. No delusions elicited.  Thought Perception: Does endorse auditory hallucinations. Denies visual hallucinations, and does not appear to be responding to any hallucinatory stimuli.   Cognition: Alert and grossly oriented. No deficits in attention, concentration or language.  Insight: fair   Judgement: fair       HOME MEDICATIONS  Medication Documentation Review Audit    **Prior to Admission medications have not yet been reviewed**          CURRENT MEDICATIONS  Scheduled medications  Scheduled Medications[5]    Continuous medications  Continuous Medications[6]    PRN medications  PRN Medications[7]     LABS  Results for orders placed or performed during the hospital encounter of 06/18/25 (from the past 24 hours)   CBC and Auto Differential   Result Value Ref Range    WBC 5.1 4.4 - 11.3 x10*3/uL    nRBC 0.0 0.0 - 0.0 /100 WBCs    RBC 4.19 4.00 - 5.20 x10*6/uL    Hemoglobin 12.0 12.0 - 16.0 g/dL    Hematocrit 34.1 (L) 36.0 - 46.0 %    MCV 81 80 - 100 fL    MCH 28.6 26.0 - 34.0 pg    MCHC 35.2 32.0 - 36.0 g/dL    RDW 13.6 11.5 - 14.5 %    Platelets 163 150 - 450 x10*3/uL    Neutrophils % 36.4 40.0 - 80.0 %    Immature Granulocytes %, Automated 0.2 0.0 - 0.9 %    Lymphocytes % 49.1 13.0 - 44.0 %    Monocytes % 10.6 2.0 - 10.0 %    Eosinophils % 3.3 0.0 - 6.0 %    Basophils % 0.4 0.0 - 2.0 %    Neutrophils Absolute 1.86 1.20 - 7.70 x10*3/uL    Immature Granulocytes Absolute, Automated 0.01 0.00 - 0.70 x10*3/uL    Lymphocytes Absolute 2.51 1.20 - " 4.80 x10*3/uL    Monocytes Absolute 0.54 0.10 - 1.00 x10*3/uL    Eosinophils Absolute 0.17 0.00 - 0.70 x10*3/uL    Basophils Absolute 0.02 0.00 - 0.10 x10*3/uL   Comprehensive metabolic panel   Result Value Ref Range    Glucose 107 (H) 74 - 99 mg/dL    Sodium 139 136 - 145 mmol/L    Potassium 3.7 3.5 - 5.3 mmol/L    Chloride 104 98 - 107 mmol/L    Bicarbonate 30 21 - 32 mmol/L    Anion Gap 9 (L) 10 - 20 mmol/L    Urea Nitrogen 6 6 - 23 mg/dL    Creatinine 0.62 0.50 - 1.05 mg/dL    eGFR >90 >60 mL/min/1.73m*2    Calcium 9.3 8.6 - 10.6 mg/dL    Albumin 4.2 3.4 - 5.0 g/dL    Alkaline Phosphatase 39 33 - 136 U/L    Total Protein 6.9 6.4 - 8.2 g/dL    AST 10 9 - 39 U/L    Bilirubin, Total 0.4 0.0 - 1.2 mg/dL    ALT 3 (L) 7 - 45 U/L   Magnesium   Result Value Ref Range    Magnesium 2.04 1.60 - 2.40 mg/dL   Creatine Kinase   Result Value Ref Range    Creatine Kinase 102 0 - 215 U/L   Lactate   Result Value Ref Range    Lactate 1.0 0.4 - 2.0 mmol/L   Troponin I, High Sensitivity   Result Value Ref Range    Troponin I, High Sensitivity (CMC) <3 0 - 34 ng/L   B-Type Natriuretic Peptide   Result Value Ref Range    BNP 93 0 - 99 pg/mL   Urinalysis with Reflex Microscopic   Result Value Ref Range    Color, Urine Light-Yellow Light-Yellow, Yellow, Dark-Yellow    Appearance, Urine Turbid (N) Clear    Specific Gravity, Urine 1.007 1.005 - 1.035    pH, Urine 7.0 5.0, 5.5, 6.0, 6.5, 7.0, 7.5, 8.0    Protein, Urine NEGATIVE NEGATIVE, 10 (TRACE), 20 (TRACE) mg/dL    Glucose, Urine Normal Normal mg/dL    Blood, Urine NEGATIVE NEGATIVE mg/dL    Ketones, Urine NEGATIVE NEGATIVE mg/dL    Bilirubin, Urine NEGATIVE NEGATIVE mg/dL    Urobilinogen, Urine Normal Normal mg/dL    Nitrite, Urine NEGATIVE NEGATIVE    Leukocyte Esterase, Urine 500 Patricia/uL (A) NEGATIVE   Microscopic Only, Urine   Result Value Ref Range    WBC, Urine 11-20 (A) 1-5, NONE /HPF    RBC, Urine 1-2 NONE, 1-2, 3-5 /HPF    Squamous Epithelial Cells, Urine 10-25 (FEW) Reference  range not established. /HPF    Mucus, Urine FEW Reference range not established. /LPF        IMAGING  Imaging  CT head wo IV contrast  Result Date: 6/19/2025  CT HEAD: 1. No acute intracranial hemorrhage or mass effect.       CT CERVICAL SPINE: 1. No acute fracture or traumatic malalignment of the cervical spine. 2. Multilevel degenerative changes as detailed above liver moderate canal narrowing and significant foraminal stenosis.     I personally reviewed the images/study and resident's interpretation and I agree with the findings as stated by Josie Fisher MD (resident radiologist). This study was analyzed and interpreted at [place], [place].   MACRO: None.   Signed by: Alyson Sevilla 6/19/2025 12:42 AM Dictation workstation:   [id number]    CT cervical spine wo IV contrast  Result Date: 6/19/2025  CT HEAD: 1. No acute intracranial hemorrhage or mass effect.       CT CERVICAL SPINE: 1. No acute fracture or traumatic malalignment of the cervical spine. 2. Multilevel degenerative changes as detailed above liver moderate canal narrowing and significant foraminal stenosis.     I personally reviewed the images/study and resident's interpretation and I agree with the findings as stated by Josie Fisher MD (resident radiologist). This study was analyzed and interpreted at [place], [place].   MACRO: None.   Signed by: Alyson Sevilla 6/19/2025 12:42 AM Dictation workstation:   [id number]    XR chest 2 views  Result Date: 6/18/2025  1.  No evidence of acute cardiopulmonary process. 2. Changes consistent with chronic lung disease.   I personally reviewed the images/study and resident's interpretation and I agree with the findings as stated by Josie Fisher MD (resident radiologist). This study was analyzed and interpreted at [place], [place].   MACRO: None   Signed by: Alyson Sevilla  6/18/2025 10:45 PM Dictation workstation:   [id number]      Cardiology, Vascular, and Other Imaging  No other imaging results found for the past 2 days       PSYCHIATRIC RISK ASSESSMENT  Violence Risk Factors:  low IQ and stress/destabilizers  Acute Risk of Harm to Others is Considered: Low  Suicide Risk Factors: prior suicide attempts  and lives alone or lack of social support  Protective Factors: fear of suicide or death, sense of responsibility towards family, social support/connectedness, child-related concerns/living with child < 18 yrs age, and positive family relationships  Acute Risk of Harm to Self is Considered: Low    ASSESSMENT AND PLAN  Alesia Fernandez is a 59 y.o. female with a past psychiatric history of schizoaffective disorder, and a past medical history of drug induced parkinsonism, partial epilepsy who was admitted to [facility] on 6/18 for lower extremity weakness and tremor. Psychiatry was consulted on 6/19 for concern for EPS reaction.    On initial assessment, pt reports worsening chronic R hand rhythmic tremor, LE weakness, and UE bilat tremor, improved today. Pt endorses hallucinations, mild paranoia, and these are likely baseline/residual psychotic sx, with pt receiving perseris 120mg ALVAREZ yesterday and has been on this for multiple years. However pt does not appear overtly psychotic or decompensated on exam.  Otherwise, pt with rhythmic tremor in R hand, mild similar tremor in LE bilat, improved with distraction. On chart review and per collateral, no recent dose change with perseris or other psychiatric med changes. Given this and exam findings, low concern this is an acute dystonic reaction, akithisia, or beginning of a process such as NMS. Low concern for seizure episode. Pt likely with worsening chronic drug induced parkinsonism, she has been on various antipsychotics and cogentin in the past, now on sinemet and artane. Would recommend neurology consult for additional  "recommendations/management, and MPU transfer.     IMPRESSION  H/o schizoaffective disorder    Drug induced parkinsonism   R/o hyperactive delirium     RECOMMENDATIONS  Safety:  - Patient does not currently meet criteria for inpatient psychiatric admission.   - To evaluate decision-making capacity, recommend use of the Capacity Evaluation Tool. Search “[facility] Capacity Evaluation\" under SmartText unless the patient has a legal guardian, in which case all decisions per the legal guardian.  - Patient does not require a 1:1 sitter from a psychiatric perspective at this time.  - Defer to primary team decision for 1:1 sitter.  - As with all hospitalized patients, would recommend delirium precautions, as below.    Medications:  -s/p perseris 120mg ALVAREZ [id number] given 6/18.   -cont home lexapro 20mg PO   -cont home depakote 500mg PO QAM   -cont home lamictal 100mg PO BID   -cont nicoderm 14mg daily, consider nicotine lozenges PRN   -discontinue seroquel 100mg PO at bedtime   -start ativan 1mg PO/IM q6h PRN agitation     Work-up:  - EKG (7/19/24): QTc of 425ms ashishzett   - Monitor qtc given perseris use   - consult neurology. Consider stopping sinemet given report of limited improvement and this could make psychosis worse or mood more labile   - consider ordering urine tox, LDH, ammonia, Depakote level and Lamictal level, for completeness sake     Ancillary Services:  - per pt preference on MPU     Follow-up:  - with Dr. Lance     Instructions for transfer to MPU:  Patient is appropriate for the Med-Psych Unit and can be transferred for co-management with the primary team  Please note that the patient and her daughter approved transfer   (Patient must be assigned a primary medical or surgical team prior to transfer  Patients must have initial psychiatric consult recommendations or agitation order set ordered prior to transfer  Place STAT transfer order to [address] / U     DELIRIUM GUIDELINES  Non-Pharmacologic:  - Assess " visual and hearing impairments and provide aids and communication boards.  - Assess immobility and advocate for early evaluation and intervention by physical therapy, out of bed when medically indicated, and expeditious removal of tethers.  - Promote physiologic sleep and maintenance of sleep/wake cycle by ensuring blinds are open during the day, maintaining dark/quiet room at night with minimal interruptions, and minimizing daytime naps.  - Minimize room and staff changes.  - Engage the patient in cognitively stimulating activities and provide frequent reorientation.   - Minimize use of restraints to situations where necessary to keep patient and staff safe and to prevent from removing lines, tubes, medical devices, dressings, etc.      Pharmacologic:  - Minimize use of deliriogenic medications such as benzodiazepines, anticholinergic medications, and opiates (while ensuring adequate treatment of pain).  - Assess and treat disruption in bowel and bladder function.   - Assess and treat abnormalities in nutrition and hydration status.      ==========  - Discussed recommendations with primary team.  - Psychiatry will continue to follow.    Thank you for allowing us to participate in the care of this patient. Please page [phone] with any questions or concerns.    Patient seen and staffed with Dr. Schaefer, who agrees with above plan.    Cornel Burch, DO  PGY2 psychiatry     Medication Consent  Medication Consent: n/a; consult service            [1]   Social History  Socioeconomic History    Marital status: Single     Social Drivers of Health     Financial Resource Strain: Patient Unable To Answer (7/24/2024)    Received from [facility] [facility]    Overall Financial Resource Strain (CARDIA)     Difficulty of Paying Living Expenses: Patient unable to answer   Food Insecurity: Not on File (9/26/2024)    Received from [facility]    Food Insecurity     Food: 0   Transportation Needs: No Transportation Needs (7/24/2024)    Received from  [employer]    PRAPARE - Transportation     Lack of Transportation (Medical): No     Lack of Transportation (Non-Medical): No   Physical Activity: Inactive (7/24/2024)    Received from [employer]    Exercise Vital Sign     Days of Exercise per Week: 0 days     Minutes of Exercise per Session: 0 min   Stress: No Stress Concern Present (7/24/2024)    Received from [employer]    [employer] [place] [employer] - Occupational Stress Questionnaire     Feeling of Stress : Only a little   Social Connections: Not on File (9/15/2024)    Received from [facility]    Social Connections     Connectedness: 0   Recent Concern: Social Connections - Socially Isolated (7/24/2024)    Received from [employer]    Social Connection and Isolation Panel [NHANES]     Frequency of Communication with Friends and Family: More than three times a week     Frequency of Social Gatherings with Friends and Family: Never     Attends Buddhist Services: Never     Active Member of Clubs or Organizations: No     Attends Club or Organization Meetings: Never     Marital Status:    Intimate Partner Violence: Not At Risk (7/24/2024)    Received from [employer]    Humiliation, Afraid, Rape, and Kick questionnaire     Fear of Current or Ex-Partner: No     Emotionally Abused: No     Physically Abused: No     Sexually Abused: No   Housing Stability: Low Risk  (7/24/2024)    Received from [employer]    Housing Stability Vital Sign     Unable to Pay for Housing in the Last Year: No     Number of Times Moved in the Last Year: 0     Homeless in the Last Year: No   [2] No past medical history on file.  [3] No past surgical history on file.  [4] No family history on file.  [5] carbidopa-levodopa, 1 tablet, oral, TID  cholecalciferol, 25 mcg, oral, Daily  divalproex, 500 mg, oral, Nightly  docusate sodium, 100 mg, oral, BID  enoxaparin, 40 mg, subcutaneous, q24h  escitalopram, 20 mg, oral, Daily  lamoTRIgine, 100 mg, oral, BID  latanoprost, 1 drop,  Both Eyes, Nightly  nicotine, 1 patch, transdermal, q24h  polyethylene glycol, 17 g, oral, Daily  propranolol, 20 mg, oral, TID  QUEtiapine, 100 mg, oral, Nightly  trihexyphenidyl, 3 mg, oral, TID  [6]    [7] PRN medications: nicotine polacrilex

## 2025-06-19 NOTE — PROGRESS NOTES
Physical Therapy    Physical Therapy Evaluation & Treatment    Patient Name: Alesia Fernandez  MRN: [id number]  Department: [facility] [place] [facility]  Room: [place] [id number]  Today's Date: 6/19/2025   Time Calculation  Start Time: 0907  Stop Time: 0949  Time Calculation (min): 42 min    Assessment/Plan   PT Assessment  PT Assessment Results: Decreased strength, Impaired balance, Decreased mobility, Decreased coordination  Rehab Prognosis: Good  Barriers to Discharge Home: Caregiver assistance, Physical needs  Caregiver Assistance: Patient lives alone and/or does not have reliable caregiver assistance  Physical Needs: High falls risk due to function or environment  Evaluation/Treatment Tolerance: Patient tolerated treatment well  Medical Staff Made Aware: Yes  Strengths: Premorbid level of function  Barriers to Participation: Comorbidities  End of Session Communication: Bedside nurse  Assessment Comment: Previously independent 59 y.o. female presents to ED with tremors and weakness. Pt able to ambulate to and from bathroom multiple times during session; however, LE tremors cause B knee buckling during gait. With current severity of tremors, pt is most appropriate for Moderate Intensity Rehab after DC to address these deficits.  End of Session Patient Position: Bed, 2 rail up, Alarm off, not on at start of session   IP OR SWING BED PT PLAN  Inpatient or Swing Bed: Inpatient  PT Plan  Treatment/Interventions: Bed mobility, Transfer training, Gait training, Stair training, Balance training, Strengthening, Endurance training, Neuromuscular re-education, Therapeutic exercise, Therapeutic activity  PT Plan: Ongoing PT  PT Frequency: 3 times per week  PT Discharge Recommendations: Moderate intensity level of continued care  Equipment Recommended upon Discharge: Wheeled walker  PT Recommended Transfer Status: Assist x1, Assistive device  PT - OK to Discharge: Yes      Subjective     PT Visit Info:  PT Received On: 06/19/25  General Visit  Information:  General  Reason for Referral: Presenting to the ED for evaluation of lower extremity tremors and weakness  Past Medical History Relevant to Rehab: History of schizophrenia/schizoaffective disorder on Invega injection, partial epilepsy, drug-induced parkinsonism  Family/Caregiver Present: No  Prior to Session Communication: Bedside nurse  Patient Position Received: Bed, 2 rail up, Alarm off, not on at start of session  Preferred Learning Style: auditory, verbal, visual  General Comment: Pt pleasant and agreeable to PT session  Home Living:  Home Living  Type of Home: Apartment  Lives With: Alone  Home Adaptive Equipment: None  Home Layout: One level  Home Access: Elevator (10th floor)  Bathroom Shower/Tub: Tub/shower unit  Bathroom Toilet: Standard  Bathroom Equipment: Grab bars in shower  Prior Level of Function:  Prior Function Per Pt/Caregiver Report  Level of [place]: Independent with ADLs and functional transfers, Independent with homemaking with ambulation  Ambulatory Assistance: Independent  Prior Function Comments: Cannot remember number of falls in past month, (-) drives  Precautions:  Precautions  Hearing/Visual Limitations: WFL  Medical Precautions: Fall precautions     Date/Time Vitals Session Patient Position Pulse Resp SpO2 BP MAP (mmHg)    06/19/25 1029 --  --  71  15  95 %  119/70  --               Objective   Pain:  Pain Assessment  Pain Assessment: 0-10  0-10 (Numeric) Pain Score: 0 - No pain  Cognition:  Cognition  Overall Cognitive Status: Within Functional Limits  Orientation Level: Oriented X4  Insight: Mild  Impulsive: Mildly    General Assessments:    Activity Tolerance  Endurance: Tolerates 30 min exercise with multiple rests  Early Mobility/Exercise Safety Screen: Proceed with mobilization - No exclusion criteria met    Sensation  Light Touch: No apparent deficits    Coordination  Movements are Fluid and Coordinated: No (BUE, BLE. and full body tremors present at times  during session. Tremors increasing with mobility)    Postural Control  Postural Control: Within Functional Limits    Static Sitting Balance  Static Sitting-Balance Support: Feet supported, Bilateral upper extremity supported  Static Sitting-Level of Assistance: Close supervision    Static Standing Balance  Static Standing-Balance Support: Bilateral upper extremity supported  Static Standing-Level of Assistance: Contact guard  Functional Assessments:     Bed Mobility  Bed Mobility: Yes  Bed Mobility 1  Bed Mobility 1: Supine to sitting, Sitting to supine  Level of Assistance 1: Contact guard  Bed Mobility Comments 1: HOB elevated    Transfers  Transfer: Yes  Transfer 1  Transfer From 1: Bed to, Stand to  Transfer to 1: Stand, Toilet  Technique 1: Sit to stand, Stand to sit  Transfer Device 1: Walker  Transfer Level of Assistance 1: Contact guard, Minimal verbal cues  Trials/Comments 1: VCs for sequencing  Transfers 2  Transfer From 2: Toilet to, Stand to  Transfer to 2: Stand, Bed  Technique 2: Sit to stand, Stand to sit  Transfer Device 2: Walker  Transfer Level of Assistance 2: Contact guard  Transfers 3  Transfer From 3: Bed to, Stand to  Transfer to 3: Stand, Bed  Technique 3: Sit to stand, Stand to sit  Transfer Device 3: Walker  Transfer Level of Assistance 3: Contact guard    Ambulation/Gait Training  Ambulation/Gait Training Performed: Yes  Ambulation/Gait Training 1  Surface 1: Level tile  Device 1: Rolling walker  Assistance 1: Minimum assistance, Minimal verbal cues (VCs for walker management)  Quality of Gait 1: Knee(s) buckle, Ataxic, Shuffling gait (Tremors causing B knee buckling during gait, however not resulting in LOB)  Comments/Distance (ft) 1: 30ft X4    Stairs  Stairs: No    Extremity/Trunk Assessments:    RLE   RLE : Exceptions to WFL  Strength RLE  RLE Overall Strength: Greater than or equal to 3/5 as evidenced by functional mobility  LLE   LLE : Exceptions to WFL  Strength LLE  LLE Overall  Strength: Greater than or equal to 3/5 as evidenced by functional mobility  Treatments:    Treatment for increased time performing multiple transfers and multiple bouts of ambulation as documented above. Increased time demonstrating static and dynamic standing balance to perform hygiene care (wash face, brush teeth, wash hands).    Outcome Measures:  [facility] Basic Mobility  Turning from your back to your side while in a flat bed without using bedrails: A little  Moving from lying on your back to sitting on the side of a flat bed without using bedrails: A little  Moving to and from bed to chair (including a wheelchair): A little  Standing up from a chair using your arms (e.g. wheelchair or bedside chair): A little  To walk in hospital room: A little  Climbing 3-5 steps with railing: Total  Basic Mobility - Total Score: 16    Encounter Problems       Encounter Problems (Active)       Balance       STG - Maintains dynamic standing balance without upper extremity support with SBA and no LOB for >60s (Progressing)       Start:  06/19/25    Expected End:  07/03/25       INTERVENTIONS:1. Practice standing with minimal support.2. Educate patient about standing tolerance.3. Educate patient about independence with gait, transfers, and ADL's.4. Educate patient about use of assistive device.5. Educate patient about self-directed care.            Mobility       LTG - Patient will be able to go up and down a curb/step with the appropriate device and SBA to facilitate community ambulation (Not Progressing)       Start:  06/19/25    Expected End:  07/03/25            LTG - Patient will ambulate community distance of 300ft independently with LRD (Progressing)       Start:  06/19/25    Expected End:  07/03/25               PT Transfers       STG - Patient will perform bed mobility independently (Progressing)       Start:  06/19/25    Expected End:  07/03/25            STG - Patient will transfer sit to and from stand independently with  LRD (Progressing)       Start:  06/19/25    Expected End:  07/03/25                   Education Documentation  Precautions, taught by Coby Bell PT at 6/19/2025 11:05 AM.  Learner: Patient  Readiness: Eager  Method: Explanation, Demonstration  Response: Verbalizes Understanding, Demonstrated Understanding  Comment: PT POC, AD use    Body Mechanics, taught by Coby Bell PT at 6/19/2025 11:05 AM.  Learner: Patient  Readiness: Eager  Method: Explanation, Demonstration  Response: Verbalizes Understanding, Demonstrated Understanding  Comment: PT POC, AD use    Mobility Training, taught by Coby Bell PT at 6/19/2025 11:05 AM.  Learner: Patient  Readiness: Eager  Method: Explanation, Demonstration  Response: Verbalizes Understanding, Demonstrated Understanding  Comment: PT POC, AD use    Education Comments  No comments found.

## 2025-06-19 NOTE — DISCHARGE INSTRUCTIONS
You were admitted with concern for worsening parkinson like symptoms, trouble walking and caring for yourself. You were seen by psychiatry and neurology, at this time your sx and improved as well as your function and no changes to your regimen is recommended. It is recommended that you follow up with psychiatry as scheduled, you reported follow up scheduled for  6/23/25. You were seen by therapy and recommended moderate intesnity therapy at a skilled nursing level but declined as you feel that you are functioning better and safe to return home with your current level of support. We discussed rehab options and you were referred for home care for therapy. Please follow-up with primary care within 7 to 10 days or next soonest available for post-hospitalization assessment and examination.  Please take all medications as prescribed and keep all follow-up appointments.  Please contact your primary care physician with any questions or concerns that arise.  You may contact your outpatient specialists office for any questions regarding specifics relating to their recommendations. Please monitor your symptoms and come to the emergency department with worsening of your symptoms, severe chest pain, shortness of breath, or any other medical emergency or concerns for your health.

## 2025-06-19 NOTE — H&P
History and Physical      Alesia Fernandez  :  1965(59 y.o.)  MRN:  [id number]    PCP: Padma Strong       Date: 25     Subjective:      HPI:  Alesia Fernandez is a 59-year-old female history of schizophrenia/schizoaffective disorder on Invega injection, partial epilepsy, drug-induced parkinsonism presenting to the ED for evaluation of lower extremity tremors and weakness. History of seizure disorder involving tongue biting and lip smacking, and a history of tremors believed secondary to a hypoxic event as a child. Tremors have been present for 25 years. She has no known breakthrough seizures and is compliant on Depakote, also gets Invega injections which she received today for her psychiatric disorder. Since yesterday she has had weakness in her legs, particularly when walking. She denies any falls. Denied any numbness/tingling or pain, no recent illness.        Problem List[1]   Medical History[2]  Surgical History[3]  Current Outpatient Medications   Medication Instructions    ARIPiprazole ER (ABILIFY MAINTENA) 400 mg, intramuscular    carbidopa-levodopa (Sinemet)  mg tablet 1 tablet, 3 times daily    cholecalciferol (VITAMIN D-3) 25 mcg, oral    divalproex (Depakote ER) 500 mg 24 hr tablet 2 tablets, Nightly    docusate sodium (COLACE) 100 mg, 2 times daily    escitalopram (LEXAPRO) 20 mg, Daily    ibuprofen 800 mg tablet 1 tablet, oral, Every 8 hours PRN    ibuprofen 800 mg, oral, Every 8 hours PRN    Invega Sustenna 156 mg/mL syringe 156MG EVERY 4 WEEKS    Invega Sustenna 234 mg, intramuscular, Every 30 days    lamoTRIgine (LAMICTAL) 100 mg, oral, 2 times daily    latanoprost (Xalatan) 0.005 % ophthalmic solution 1 drop    magnesium hydroxide (Milk of Magnesia) 400 mg/5 mL suspension oral    multivitamin (Daily-Venita, with folic acid,) tablet 1 tablet, Daily RT    naproxen (Naprosyn) 500 mg tablet oral    nicotine (Nicoderm CQ) 14 mg/24 hr patch 1 patch, Every 24 hours    nicotine  polacrilex (NICORETTE) 2 mg, oral    nitrofurantoin, macrocrystal-monohydrate, (Macrobid) 100 mg capsule     oxyCODONE-acetaminophen (Percocet) 5-325 mg tablet oral    Perseris 120 mg suspension,extended rel syring subcutaneous injection 120MG UNDER THE SKIN EVERY 4 WEEKS    phenytoin ER (Dilantin) 100 mg capsule oral    polyethylene glycol (GLYCOLAX, MIRALAX) 17 g, oral, Daily RT    propranolol (INDERAL) 20 mg, 3 times daily    psyllium (Metamucil) 3.4 gram packet oral    QUEtiapine (SEROQUEL) 100 mg, Nightly    trihexyphenidyl (ARTANE) 3 mg, 3 times daily      RX Allergies[4]   Social History[5]  Family History[6]    Scheduled Medications:   Scheduled Medications[7]     Continuous Medications:   Continuous Medications[8]     PRN Medications:   PRN Medications[9]    Review of Systems:  Review of Systems   Ten point review of systems negative unless specified in HPI.     Objective:     Vitals:    06/18/25 2100 06/19/25 0032 06/19/25 0402   BP: 135/86 141/80 151/80   BP Location:  Right arm Right arm   Patient Position:  Lying Lying   Pulse: 82 68 70   Resp: 18 18 17   Temp: 36.9 °C (98.4 °F) 36.5 °C (97.7 °F) 36.8 °C (98.2 °F)   TempSrc:  Temporal Temporal   SpO2: 94% (!) 93% 96%        24hr Min/Max:  Temp  Min: 36.5 °C (97.7 °F)  Max: 36.9 °C (98.4 °F)  Pulse  Min: 68  Max: 82  BP  Min: 135/86  Max: 151/80  Resp  Min: 17  Max: 18  SpO2  Min: 93 %  Max: 96 %    No intake or output data in the 24 hours ending 06/19/25 0653    Physical Exam  General:  Asleep, easily aroused to voice.   Head: NCAT  Cardiovascular:  regular rate  Pulmonary:  comfortable on room air  Neurologic:  grossly intact  Musculoskeletal: moves all extremities spontaneously  Psychiatric:  appropriate mood and affect  *declined any further examination*    Labs:   Results for orders placed or performed during the hospital encounter of 06/18/25 (from the past 24 hours)   CBC and Auto Differential   Result Value Ref Range    WBC 5.1 4.4 - 11.3  x10*3/uL    nRBC 0.0 0.0 - 0.0 /100 WBCs    RBC 4.19 4.00 - 5.20 x10*6/uL    Hemoglobin 12.0 12.0 - 16.0 g/dL    Hematocrit 34.1 (L) 36.0 - 46.0 %    MCV 81 80 - 100 fL    MCH 28.6 26.0 - 34.0 pg    MCHC 35.2 32.0 - 36.0 g/dL    RDW 13.6 11.5 - 14.5 %    Platelets 163 150 - 450 x10*3/uL    Neutrophils % 36.4 40.0 - 80.0 %    Immature Granulocytes %, Automated 0.2 0.0 - 0.9 %    Lymphocytes % 49.1 13.0 - 44.0 %    Monocytes % 10.6 2.0 - 10.0 %    Eosinophils % 3.3 0.0 - 6.0 %    Basophils % 0.4 0.0 - 2.0 %    Neutrophils Absolute 1.86 1.20 - 7.70 x10*3/uL    Immature Granulocytes Absolute, Automated 0.01 0.00 - 0.70 x10*3/uL    Lymphocytes Absolute 2.51 1.20 - 4.80 x10*3/uL    Monocytes Absolute 0.54 0.10 - 1.00 x10*3/uL    Eosinophils Absolute 0.17 0.00 - 0.70 x10*3/uL    Basophils Absolute 0.02 0.00 - 0.10 x10*3/uL   Comprehensive metabolic panel   Result Value Ref Range    Glucose 107 (H) 74 - 99 mg/dL    Sodium 139 136 - 145 mmol/L    Potassium 3.7 3.5 - 5.3 mmol/L    Chloride 104 98 - 107 mmol/L    Bicarbonate 30 21 - 32 mmol/L    Anion Gap 9 (L) 10 - 20 mmol/L    Urea Nitrogen 6 6 - 23 mg/dL    Creatinine 0.62 0.50 - 1.05 mg/dL    eGFR >90 >60 mL/min/1.73m*2    Calcium 9.3 8.6 - 10.6 mg/dL    Albumin 4.2 3.4 - 5.0 g/dL    Alkaline Phosphatase 39 33 - 136 U/L    Total Protein 6.9 6.4 - 8.2 g/dL    AST 10 9 - 39 U/L    Bilirubin, Total 0.4 0.0 - 1.2 mg/dL    ALT 3 (L) 7 - 45 U/L   Magnesium   Result Value Ref Range    Magnesium 2.04 1.60 - 2.40 mg/dL   Creatine Kinase   Result Value Ref Range    Creatine Kinase 102 0 - 215 U/L   Lactate   Result Value Ref Range    Lactate 1.0 0.4 - 2.0 mmol/L   Troponin I, High Sensitivity   Result Value Ref Range    Troponin I, High Sensitivity (CMC) <3 0 - 34 ng/L   B-Type Natriuretic Peptide   Result Value Ref Range    BNP 93 0 - 99 pg/mL   Urinalysis with Reflex Microscopic   Result Value Ref Range    Color, Urine Light-Yellow Light-Yellow, Yellow, Dark-Yellow    Appearance,  Urine Turbid (N) Clear    Specific Gravity, Urine 1.007 1.005 - 1.035    pH, Urine 7.0 5.0, 5.5, 6.0, 6.5, 7.0, 7.5, 8.0    Protein, Urine NEGATIVE NEGATIVE, 10 (TRACE), 20 (TRACE) mg/dL    Glucose, Urine Normal Normal mg/dL    Blood, Urine NEGATIVE NEGATIVE mg/dL    Ketones, Urine NEGATIVE NEGATIVE mg/dL    Bilirubin, Urine NEGATIVE NEGATIVE mg/dL    Urobilinogen, Urine Normal Normal mg/dL    Nitrite, Urine NEGATIVE NEGATIVE    Leukocyte Esterase, Urine 500 Patricia/uL (A) NEGATIVE   Microscopic Only, Urine   Result Value Ref Range    WBC, Urine 11-20 (A) 1-5, NONE /HPF    RBC, Urine 1-2 NONE, 1-2, 3-5 /HPF    Squamous Epithelial Cells, Urine 10-25 (FEW) Reference range not established. /HPF    Mucus, Urine FEW Reference range not established. /LPF       Imaging:   CT head wo IV contrast  Result Date: 6/19/2025  Interpreted By:  Alyson Sevilla and Hofer Lindsay STUDY: CT HEAD WO IV CONTRAST; CT CERVICAL SPINE WO IV CONTRAST;  6/19/2025 12:08 am   INDICATION: Signs/Symptoms:weakness, tremors, falls; Signs/Symptoms:b/l leg weakness, falls, tremors   COMPARISON: CT head 10/01/2023.   ACCESSION NUMBER(S): [id number]; [id number]   ORDERING CLINICIAN: MARLA HILLIARD   TECHNIQUE: Axial noncontrast CT images of head with coronal and sagittal reconstructed images. Axial noncontrast CT images of the cervical spine with coronal and sagittal reconstructed images.   FINDINGS: CT HEAD:   BRAIN PARENCHYMA: There are periventricular and subcortical white matter hypodensities, nonspecific, consistent with chronic small-vessel ischemic changes. No acute intraparenchymal hemorrhage or parenchymal evidence of acute large territory ischemic infarct. No mass-effect. Gray-white matter distinction is preserved.   VENTRICLES and EXTRA-AXIAL SPACES: Incidentally noted septum cavum pellucidum. No acute extra-axial or intraventricular hemorrhage. No effacement of cerebral sulci. The ventricles and sulci are age-concordant.   PARANASAL  SINUSES/MASTOIDS: The paranasal sinuses are clear. The mastoids are well aerated.   CALVARIUM/ORBITS: No skull fracture. Periapical cyst of one of the frontal maxillary teeth. The orbits and globes are intact to the extent visualized.   EXTRACRANIAL SOFT TISSUES: No discernible hematoma.     CT CERVICAL SPINE:   PREVERTEBRAL SOFT TISSUES: Within normal limits.   CRANIOCERVICAL JUNCTION: Intact.   ALIGNMENT: Straightening. No traumatic malalignment or traumatic facet widening.   VERTEBRAE: No acute fracture. Vertebral body heights are presumed maintained.   SPINAL CANAL/INTERVERTEBRAL DISCS: Disc osteophyte complexes at C4-C5 and C5-C6 and C6-C7 causing moderate spinal canal stenosis. Multilevel intervertebral disc height loss and degenerative disc disease.   NEURAL FORAMINA: Severe neural foraminal stenosis at C4-C5 and C5-C6   OTHER: The visualized lung fields are clear.       CT HEAD: 1. No acute intracranial hemorrhage or mass effect.       CT CERVICAL SPINE: 1. No acute fracture or traumatic malalignment of the cervical spine. 2. Multilevel degenerative changes as detailed above liver moderate canal narrowing and significant foraminal stenosis.     I personally reviewed the images/study and resident's interpretation and I agree with the findings as stated by Josie Fisher MD (resident radiologist). This study was analyzed and interpreted at [place], [place].   MACRO: None.   Signed by: Alyson Sevilla 6/19/2025 12:42 AM Dictation workstation:   [id number]    CT cervical spine wo IV contrast  Result Date: 6/19/2025  Interpreted By:  Alyson Sevilla,  Tish Galindo STUDY: CT HEAD WO IV CONTRAST; CT CERVICAL SPINE WO IV CONTRAST;  6/19/2025 12:08 am   INDICATION: Signs/Symptoms:weakness, tremors, falls; Signs/Symptoms:b/l leg weakness, falls, tremors   COMPARISON: CT head 10/01/2023.   ACCESSION NUMBER(S): [id number]; [id number]   ORDERING CLINICIAN: MARLA  BABAK   TECHNIQUE: Axial noncontrast CT images of head with coronal and sagittal reconstructed images. Axial noncontrast CT images of the cervical spine with coronal and sagittal reconstructed images.   FINDINGS: CT HEAD:   BRAIN PARENCHYMA: There are periventricular and subcortical white matter hypodensities, nonspecific, consistent with chronic small-vessel ischemic changes. No acute intraparenchymal hemorrhage or parenchymal evidence of acute large territory ischemic infarct. No mass-effect. Gray-white matter distinction is preserved.   VENTRICLES and EXTRA-AXIAL SPACES: Incidentally noted septum cavum pellucidum. No acute extra-axial or intraventricular hemorrhage. No effacement of cerebral sulci. The ventricles and sulci are age-concordant.   PARANASAL SINUSES/MASTOIDS: The paranasal sinuses are clear. The mastoids are well aerated.   CALVARIUM/ORBITS: No skull fracture. Periapical cyst of one of the frontal maxillary teeth. The orbits and globes are intact to the extent visualized.   EXTRACRANIAL SOFT TISSUES: No discernible hematoma.     CT CERVICAL SPINE:   PREVERTEBRAL SOFT TISSUES: Within normal limits.   CRANIOCERVICAL JUNCTION: Intact.   ALIGNMENT: Straightening. No traumatic malalignment or traumatic facet widening.   VERTEBRAE: No acute fracture. Vertebral body heights are presumed maintained.   SPINAL CANAL/INTERVERTEBRAL DISCS: Disc osteophyte complexes at C4-C5 and C5-C6 and C6-C7 causing moderate spinal canal stenosis. Multilevel intervertebral disc height loss and degenerative disc disease.   NEURAL FORAMINA: Severe neural foraminal stenosis at C4-C5 and C5-C6   OTHER: The visualized lung fields are clear.       CT HEAD: 1. No acute intracranial hemorrhage or mass effect.       CT CERVICAL SPINE: 1. No acute fracture or traumatic malalignment of the cervical spine. 2. Multilevel degenerative changes as detailed above liver moderate canal narrowing and significant foraminal stenosis.     I  personally reviewed the images/study and resident's interpretation and I agree with the findings as stated by Josie Fisher MD (resident radiologist). This study was analyzed and interpreted at [place], [place].   MACRO: None.   Signed by: Alyson Sevilla 6/19/2025 12:42 AM Dictation workstation:   [id number]    XR chest 2 views  Result Date: 6/18/2025  Interpreted By:  Alyson Sevilla  and Natalia Galindo STUDY: XR CHEST 2 VIEWS;  6/18/2025 10:12 pm   INDICATION: Signs/Symptoms:weakness.     COMPARISON: Chest radiograph 07/10/2017.   ACCESSION NUMBER(S): [id number]   ORDERING CLINICIAN: MARLA HILLIARD   FINDINGS: PA and lateral radiographs of the chest were provided.     CARDIOMEDIASTINAL SILHOUETTE: Cardiomediastinal silhouette is normal in size and configuration. Calcifications of the aortic arch.   LUNGS: Coarse interstitial lung markings throughout the bilateral lungs, consistent with changes of chronic lung disease. No focal pulmonary consolidations. No pleural effusions or pneumothorax seen.   ABDOMEN: No remarkable upper abdominal findings.   BONES: Severe degenerative changes of the bilateral glenohumeral joints. Surgical nail within the inferior aspect of the right glenoid.       1.  No evidence of acute cardiopulmonary process. 2. Changes consistent with chronic lung disease.   I personally reviewed the images/study and resident's interpretation and I agree with the findings as stated by Josie Fisher MD (resident radiologist). This study was analyzed and interpreted at [place], [place].   MACRO: None   Signed by: Alyson Sevilla 6/18/2025 10:45 PM Dictation workstation:   [id number]      Assessment and Plan     59 y.o. female presenting for evaluation of lower extremity weakness. Imaging and labs in the ED found to be unremarkable, and the patient was admitted for further evaluation. Determined to be  hemodynamically stable and appropriate for regular nursing floor. Admitted to medicine for further management.     Bilateral lower extremity weakness  Upper and lower extremity tremors  Drug induced Parkinsonism  - likely medication induced parkinsonism   - continue home propranolol and trihexyphenidyl   - PT/OT  - fall precautions    Schizoaffective disorder  Seizure disorder  - continue home meds    Glaucoma  - latanprost gtt    Tobacco use  - counseled on cessation   - nicotine patches    DVT Prophylaxis: lovenox 40mg q24h - CrCl cannot be calculated (Unknown ideal weight.).      Code status: Full Code  Diet: Adult diet Regular    Disposition: Admit for observation and  await clinical improvement and treatment response    Leonard Crews MD  Family Medicine Resident       [1]   Patient Active Problem List  Diagnosis    Bipolar disorder, unspecified (Multi)    Carpal tunnel syndrome of right wrist    Chronic right shoulder pain    Closed fracture of ankle    Closed fracture of proximal phalanx of little finger    Drug-induced parkinsonism (Multi)    Dyslipidemia    Fecal incontinence    Gait abnormality    Memory loss    Nicotine use disorder    Noncompliance with medication regimen    Nonintractable generalized idiopathic epilepsy without status epilepticus (Multi)    Obesity    Pain in joint, shoulder region    Partial idiopathic epilepsy with seizures of localized onset, not intractable, without status epilepticus    Schizoaffective disorder (Multi)    Schizophrenia    Seizure disorder (Multi)    Severe protein-calorie malnutrition (Multi)    Sleepwalking    Systemic lupus erythematosus (Multi)    Trimalleolar fracture of right ankle, closed, with routine healing, subsequent encounter    Tubal ligation status    Psychosis, paranoid (Multi)    Tremor   [2] No past medical history on file.  [3] No past surgical history on file.  [4]   Allergies  Allergen Reactions    Quetiapine Other     Makes me go to sleep   [5]     [6] No family history on file.  [7] carbidopa-levodopa, 1 tablet, oral, TID  cholecalciferol, 25 mcg, oral, Daily  divalproex, 500 mg, oral, Nightly  docusate sodium, 100 mg, oral, BID  enoxaparin, 40 mg, subcutaneous, q24h  escitalopram, 20 mg, oral, Daily  lamoTRIgine, 100 mg, oral, BID  latanoprost, 1 drop, Both Eyes, Nightly  nicotine, 1 patch, transdermal, q24h  polyethylene glycol, 17 g, oral, Daily  propranolol, 20 mg, oral, TID  QUEtiapine, 100 mg, oral, Nightly  trihexyphenidyl, 3 mg, oral, TID     [8]    [9] PRN medications: nicotine polacrilex

## 2025-06-19 NOTE — PROGRESS NOTES
Pharmacy Medication History Review    Alesia Fernandez is a 59 y.o. female admitted for Tremor. Pharmacy reviewed the patient's [id number] medications and allergies for accuracy.    Medications ADDED:  Vitamin E  Medications CHANGED:  Divalproex ER  Escitalopram  Latanoprost  Quetiapine  Medications REMOVED:   Aripiprazole Injection  Vitamin D  Docusate Sodium  Ibuprofen  Paliperidone (Invega Sustenna)  Magnesium Hydroxide  Naproxen  Nicotine (patch and gum)  Nitrofurantoin  Oxycodone-Acetaminophen  Phenytoin  Polyethylene Glycol    The list below reflects the updated PTA list.   Prior to Admission Medications   Prescriptions Last Dose Informant   Perseris 120 mg suspension,extended rel syring subcutaneous injection 2025 Other   SiMG UNDER THE SKIN EVERY 4 WEEKS   QUEtiapine (SEROquel) 100 mg tablet  Other   Sig: Take 0.5 tablets (50 mg) by mouth once daily at bedtime.   VITAMIN E ACETATE ORAL  Self   Sig: Take 1 capsule by mouth once daily.   carbidopa-levodopa (Sinemet)  mg tablet  Other   Sig: Take 1 tablet by mouth 3 times a day.   divalproex (Depakote ER) 500 mg 24 hr tablet  Other   Sig: Take 1 tablet (500 mg) by mouth once daily.   escitalopram (Lexapro) 20 mg tablet  Other   Sig: Take 0.5 tablets (10 mg) by mouth once daily.   lamoTRIgine (LaMICtal) 100 mg tablet  Other   Sig: Take 1 tablet (100 mg) by mouth twice a day.   latanoprost (Xalatan) 0.005 % ophthalmic solution  Other   Sig: Administer 1 drop into the left eye once daily at bedtime.   multivitamin (Daily-Venita, with folic acid,) tablet  Self, Other   Sig: Take 1 tablet by mouth once daily.   propranolol (Inderal) 20 mg tablet  Other   Sig: Take 1 tablet (20 mg) by mouth 3 times a day.   trihexyphenidyl (Artane) 2 mg tablet  Other   Sig: Take 1.5 tablets (3 mg) by mouth 3 times a day.      Facility-Administered Medications: None        The list below reflects the updated allergy list. Please review each documented allergy  "for additional clarification and justification.  Allergies  Reviewed by Jammie Almonte RPh on 6/19/2025   No Known Allergies         Patient accepts M2B at discharge.     Sources:   Patient interview (fair historian, had medications in compliance packaging from [facility])  [facility]  Care Everywhere  Chart Review  Medication Dispense History  Call to [facility] [phone]    Additional Comments:  Call made to [facility] [phone] to verify injectable antipsychotic medication, talked to Bakari FRIAS Pharmacist.  Patient mentioned taking a medication for headaches and pain that was prescribed by a neurologist but she did not know the name of this medication. Not able to be found through chart review or fill history.    Jammie Almonte RPh  Transitions of Care Pharmacist  06/19/25     Secure Chat preferred   If no response call [phone] or Vocera \"Med Rec\"    "

## 2025-06-19 NOTE — PROGRESS NOTES
Emergency Department Transition of Care Note       Signout   I received Alesia Fernandez in signout from Dr. Linares.  Please see the ED Provider Note for all HPI, PE and MDM up to the time of signout at 11 pm.  This is in addition to the primary record.    59-year-old female history of schizophrenia/schizoaffective disorder on Invega injection, partial epilepsy, drug-induced parkinsonism presenting to the ED for evaluation of lower extremity tremors and weakness.     At the time of signout we were awaiting:  CK, CT    ED Course & Medical Decision Making   Medical Decision Making:  Under my care, patient is vitally stable in no acute distress.  Creatinine kinase is within normal limits.  Had a CT head and C-spine that showed no intracranial hemorrhage, no cervical spinal fraction or malalignment.  However patient continues to have continued bilateral lower extremity weakness and tremors and will be admitted for PT/OT evaluation.    ED Course:  Diagnoses as of 06/19/25 0609   Weakness   Coarse tremors       Disposition   As a result of their workup, the patient will require admission to the hospital.  The patient was informed of her diagnosis.  The patient was given the opportunity to ask questions and I answered them. The patient agreed to be admitted to the hospital.    Procedures   Procedures    Patient seen and discussed with ED attending physician.    Juli Torres MD  Emergency Medicine

## 2025-06-19 NOTE — PROGRESS NOTES
Alesia Fernandez is a 59 y.o. female on day 0 of admission presenting with Tremor.    Social Work Note; discharge planning    Patient reports she has no needs and knows that [facility] will call tomorrow as a follow up.  Patient reported that none of her children can pick her up and she needs a ride home.  Transportation set and escorted patient to her ride      STANLEY ZafarW

## 2025-06-19 NOTE — HOSPITAL COURSE
Alesia Fernandez is a 59-year-old female history of schizophrenia/schizoaffective disorder on Invega injection, partial epilepsy, drug-induced parkinsonism who presented for  increased involuntary movements/tremors, acute on chronic. Also feelng weakness needing help with ADLs.Ct head, c-spine, no acute abnormalities in ed,  admitted to medicine. Labs stable except for abnormal UA, + LE and 11-20 wbc but pt is denying any urinary symptoms at this time to correlate with infection. Pt seen in the am after admission and reported significant improvement in symptoms. Pt was seen by psychiatry, recommended initial changes and neurology evaluation, neurology suspected parkinsonism likely features and functional weakness, that was intermittent, not present consistently throughout testing, as well there may be a functional component to the tremor as it was distractable. Discussed with psych, to continue home medications as she had recent adjustments, no further work up at this time per neuro. Pt reported psych follow up on Monday 6/23. She was recommended mod intensity therapy but felt she had functionally improved, had sufficient help at home and elected for home care for therapy. Pt seen in the am and then in the evening, overall has significant improvement on subsequent exam, no noted tremulous activity at time of second encounter. Confirm home meds ok for dc with psych.  Pt not using nicotine supplementation, currently actively smoking, referred to nicotine cesation clinc, 4 mins spent with pt, cancelled supplementation while actively smoking. Pt arranged home care and confirmed. Pt report significant help from 5 children at home, advised if any concerns for caring for self or safety at home she should seek medical eval.

## 2025-06-19 NOTE — PROGRESS NOTES
Pharmacy Admission Order Reconciliation Review    Alesia Fernandez is a 59 y.o. female admitted for Tremor. Pharmacy reviewed the patient's unreconciled admission medications.    Prior to admission medications that were reviewed and acted on by the pharmacist include:  Vitamin E  These medications have been reconciled.     Any other unreconcilied medications have been addressed and will be ordered or held by the patient's medical team. Medications addressed by the pharmacist may be added or changed by the patient's medical team at any time.    Jammie Almonte [facility]  Transitions of Care Pharmacist  [facility]  Please reach out via Secure Chat for questions

## 2025-06-19 NOTE — SIGNIFICANT EVENT
General Appearance:  No distress, alert, interactive and cooperative.     Cardiovascular: Regular, rate and rhythm, no murmurs, normal S1 and S2. Carotid pulses intact without any bruits. Pulses +2 and equal in all extremities. No swelling, varicosities, edema, or tenderness to palpation.      Mental State: Orientation was normal to time, place and person. Recent and remote memory was intact.  Attention span and concentration were normal. Language testing was normal for comprehension, repetition, expression, and naming. The patient could correctly interpret a picture. General fund of knowledge was intact.          Cranial Nerves:   CN: Visual fields full to confrontation.   CN 3, 4, 6: Pupils round, 4 mm in diameter, equally reactive to light. Lids symmetric; no ptosis. EOMs normal alignment, full range with normal saccades, pursuit and convergence.   No nystagmus.   CN 5: Facial sensation intact bilaterally.   CN 7: Normal and symmetric facial strength. Nasolabial folds symmetric.   CN 8: Hearing intact to voice  CN 9: Palate elevates symmetrically.   CN 11: Normal strength of shoulder shrug and neck turning.   CN 12: Tongue midline, with normal bulk and strength; no fasciculations.     Motor: Normal muscle bulk, Jaw tremor present    Strength      R L  Shoulder abduction (C4-C5)  4 4  Elbow flexion (C5-C6)   5 5  Elbow extension (C7-C8)  5 5       5 5      Hip flexion (IP, L2-L3)      4 4  Knee extension (Fem, L2-L4)    4 4  Knee flexion (Sci, L5-S1)      4 4  DorsiFlex (DePer, L5-S1l)             5          5  PlantarFlex (Tibial, S1-S2)          5          5    Reflexes: Right/ Left:  Biceps 2/2, brachioradialis 2/2, triceps 2/2, patellar 0/0, ankle 0/0  toes downgoing to plantar stimulation. No clonus, frontal release signs or other pathologic reflexes present.     Sensory: In both upper and lower extremities, sensation was intact to light touch, temperature and vibration    Coordination: In both upper  extremities, finger-nose-finger was intact without dysmetria or overshoot. In both lower extremities, heel-to-shin was intact.      Gait:  Narrow base, unsteady, slow.

## 2025-06-20 ENCOUNTER — DOCUMENTATION (OUTPATIENT)
Dept: HOME HEALTH SERVICES | Facility: HOME HEALTH | Age: 60
End: 2025-06-20
Payer: COMMERCIAL

## 2025-06-24 ENCOUNTER — HOME CARE VISIT (OUTPATIENT)
Dept: HOME HEALTH SERVICES | Facility: HOME HEALTH | Age: 60
End: 2025-06-24
Payer: COMMERCIAL

## 2025-06-24 PROCEDURE — G0151 HHCP-SERV OF PT,EA 15 MIN: HCPCS

## 2025-06-24 SDOH — HEALTH STABILITY: PHYSICAL HEALTH: PHYSICAL EXERCISE: 10

## 2025-06-24 SDOH — HEALTH STABILITY: PHYSICAL HEALTH: PHYSICAL EXERCISE: STANDING

## 2025-06-24 SDOH — HEALTH STABILITY: PHYSICAL HEALTH: EXERCISE ACTIVITY: STANDING HEP

## 2025-06-24 SDOH — HEALTH STABILITY: PHYSICAL HEALTH: EXERCISE ACTIVITIES SETS: 1

## 2025-06-24 SDOH — ECONOMIC STABILITY: HOUSING INSECURITY: OPEN FLAME PRESENT: 1

## 2025-06-24 SDOH — HEALTH STABILITY: PHYSICAL HEALTH: EXERCISE COMMENTS: STANDING HEP INCLUDES: CALF RAISES, KNEE FLEXION, HIP FLEXION, MARCHING

## 2025-06-24 SDOH — HEALTH STABILITY: PHYSICAL HEALTH: EXERCISE TYPE: STANDING HEP

## 2025-06-24 SDOH — ECONOMIC STABILITY: HOUSING INSECURITY
HOME SAFETY: THERAPIST DISCUSSED WITH PATIENT AND CAREGIVER IMPORTANCE OF HOME SAFETY, SPECIFICALLY FOR CLUTTER FREE WALKING PATHWAYS AND ADEQUATE LIGHTING

## 2025-06-24 ASSESSMENT — BALANCE ASSESSMENTS
STANDING UNSUPPORTED ONE FOOT IN FRONT: 0
TURN 360 DEGREES: 1
STANDING ON ONE LEG: 1
TRANSFERS: 3
STANDING UNSUPPORTED WITH EYES CLOSED: 0 - NEEDS HELP TO KEEP FROM FALLING
STANDING UNSUPPORTED: 4
REACHING FORWARD WITH OUTSTRETCHED ARM WHILE STANDING: 1 - REACHES FORWARD BUT NEEDS SUPERVISION
STANDING TO SITTING: 4
STANDING UNSUPPORTED WITH FEET TOGETHER: 0
STANDING UNSUPPORTED WITH EYES CLOSED: 0
PICK UP OBJECT FROM THE FLOOR FROM A STANDING POSITION: 1 - UNABLE TO PICK UP AND NEEDS SUPERVISION WHILE TRYING
STANDING UNSUPPORTED ONE FOOT IN FRONT: 0 - LOSES BALANCE WHILE STEPPING OR STANDING
SITTING TO STANDING: 3
TURN 360 DEGREES: 1 - NEEDS CLOSE SUPERVISION OR VERBAL CUING
LONG VERSION TOTAL SCORE (MAX 56): 23
TRANSFERS: 3 - ABLE TO TRANSFER SAFELY DEFINITE NEED OF HANDS
COMMENTS: MODERATE FALL RISK
STANDING UNSUPPORTED WITH FEET TOGETHER: 0 - NEEDS HELP TO ATTAIN POSITION AND UNABLE TO HOLD FOR 15 SECONDS
STANDING ON ONE LEG: 1 - TRIES TO LIFT LEG UNABLE TO HOLD 3 SECONDS BUT REMAINS STANDING INDEPENDENTLY
SITTING TO STANDING: 3 - ABLE TO STAND INDEPENDENTLY USING HANDS
STANDING TO SITTING: 4 - SITS SAFELY WITH MINIMAL USE OF HANDS

## 2025-06-24 ASSESSMENT — ENCOUNTER SYMPTOMS
FATIGUES EASILY: 1
DEPRESSION: 1
PAIN LOCATION - EXACERBATING FACTORS: PROLONGED STANDING
LOSS OF SENSATION IN FEET: 0
PAIN LOCATION - PAIN FREQUENCY: FREQUENT
ARTHRALGIAS: 1
PAIN LOCATION - RELIEVING FACTORS: MEDS, REST
OCCASIONAL FEELINGS OF UNSTEADINESS: 1
PAIN SEVERITY GOAL: 2/10
LIMITED RANGE OF MOTION: 1
LOWEST PAIN SEVERITY IN PAST 24 HOURS: 4/10
PAIN LOCATION - PAIN SEVERITY: 5/10
MUSCLE WEAKNESS: 1
PAIN LOCATION: BACK
PERSON REPORTING PAIN: PATIENT
SUBJECTIVE PAIN PROGRESSION: UNCHANGED
PAIN: 1
HIGHEST PAIN SEVERITY IN PAST 24 HOURS: 6/10
HYPERTENSION: 1

## 2025-06-24 ASSESSMENT — ACTIVITIES OF DAILY LIVING (ADL)
AMBULATION ASSISTANCE: ONE PERSON
CURRENT_FUNCTION: INDEPENDENT
AMBULATION_DISTANCE/DURATION_TOLERATED: 100 FEET WITH SUPERVISION AND NO DEVICE
PHYSICAL TRANSFERS ASSESSED: 1
ENTERING_EXITING_HOME: SUPERVISION
AMBULATION ASSISTANCE: 1
AMBULATION ASSISTANCE: STAND BY ASSIST
OASIS_M1830: 05
AMBULATION ASSISTANCE ON FLAT SURFACES: 1

## 2025-07-01 ENCOUNTER — HOME CARE VISIT (OUTPATIENT)
Dept: HOME HEALTH SERVICES | Facility: HOME HEALTH | Age: 60
End: 2025-07-01
Payer: COMMERCIAL

## 2025-07-01 PROCEDURE — G0152 HHCP-SERV OF OT,EA 15 MIN: HCPCS

## 2025-07-02 ENCOUNTER — HOME CARE VISIT (OUTPATIENT)
Dept: HOME HEALTH SERVICES | Facility: HOME HEALTH | Age: 60
End: 2025-07-02
Payer: COMMERCIAL

## 2025-07-02 SDOH — HEALTH STABILITY: MENTAL HEALTH: STRESS FACTORS COMMENTS: ONGOING CARE NEEDS DUE TO PARKINSONS, SEIZURES, ANXIETY

## 2025-07-02 SDOH — ECONOMIC STABILITY: HOUSING INSECURITY: ENVIRONMENTAL RISKS: 1

## 2025-07-02 SDOH — HEALTH STABILITY: MENTAL HEALTH: SOCIAL ISOLATION: 1

## 2025-07-02 ASSESSMENT — ENCOUNTER SYMPTOMS: AGITATION: 1

## 2025-07-02 ASSESSMENT — ACTIVITIES OF DAILY LIVING (ADL)
SHOPPING_REQUIRES_ASSISTANCE: 1
LAUNDRY_REQUIRES_ASSISTANCE: 1
AMBULATION_REQUIRES_ASSISTANCE: 1

## 2025-07-03 ENCOUNTER — HOME CARE VISIT (OUTPATIENT)
Dept: HOME HEALTH SERVICES | Facility: HOME HEALTH | Age: 60
End: 2025-07-03
Payer: COMMERCIAL

## 2025-07-03 VITALS — RESPIRATION RATE: 16 BRPM | SYSTOLIC BLOOD PRESSURE: 120 MMHG | DIASTOLIC BLOOD PRESSURE: 60 MMHG | HEART RATE: 68 BPM

## 2025-07-03 PROCEDURE — G0157 HHC PT ASSISTANT EA 15: HCPCS | Mod: CQ

## 2025-07-03 SDOH — HEALTH STABILITY: PHYSICAL HEALTH

## 2025-07-03 SDOH — HEALTH STABILITY: PHYSICAL HEALTH: EXERCISE TYPE: 75% CARRYOVER

## 2025-07-03 SDOH — HEALTH STABILITY: PHYSICAL HEALTH
EXERCISE COMMENTS: BIL LE STANDING THER EX: HEEL RAISES, ALTERNATE MARCHES, HIP ABDUCTION, HS CURLS, HIP FLEXION WITH KNEE EXTENSION, MINI SQUATS, HIP EXTENSION X 10 REPS  EDUCATED IN UPRIGHT POSTURE AND PROPER TECHNIQUE, EDUCATED TO TAKE SEATED REST BREAKS AS NEEDED

## 2025-07-03 ASSESSMENT — ENCOUNTER SYMPTOMS
LOWEST PAIN SEVERITY IN PAST 24 HOURS: 4/10
HIGHEST PAIN SEVERITY IN PAST 24 HOURS: 8/10
ANGER WITHIN DEFINED LIMITS: 1
DENIES PAIN: 1
PERSON REPORTING PAIN: PATIENT
PAIN LOCATION - PAIN SEVERITY: 7/10
AGGRESSION WITHIN DEFINED LIMITS: 1
PERSON REPORTING PAIN: PATIENT
SHORTNESS OF BREATH: 1
PAIN LOCATION: BACK
PAIN: 1

## 2025-07-03 ASSESSMENT — PAIN SCALES - PAIN ASSESSMENT IN ADVANCED DEMENTIA (PAINAD)
BREATHING: 0
FACIALEXPRESSION: 0 - SMILING OR INEXPRESSIVE.
FACIALEXPRESSION: 0
CONSOLABILITY: 0
BODYLANGUAGE: 0
CONSOLABILITY: 0 - NO NEED TO CONSOLE.
TOTALSCORE: 0
NEGVOCALIZATION: 0 - NONE.
BODYLANGUAGE: 0 - RELAXED.
NEGVOCALIZATION: 0

## 2025-07-03 ASSESSMENT — ACTIVITIES OF DAILY LIVING (ADL)
PHYSICAL TRANSFERS ASSESSED: 1
AMBULATION ASSISTANCE ON FLAT SURFACES: 1
TOILETING: 1
SPONGING_LB_CURRENT_FUNCTION: STAND BY ASSIST
SPONGING_UB_CURRENT_FUNCTION: STAND BY ASSIST
TOILETING: SUPERVISION
BATHING ASSESSED: 1
BATHING_CURRENT_FUNCTION: SUPERVISION
AMBULATION ASSISTANCE ON UNEVEN SURFACES: 1
GROOMING ASSESSED: 1
CURRENT_FUNCTION: SUPERVISION
ORAL_CARE_STANDBY_ASSIST: 1
AMBULATION ASSISTANCE: SUPERVISION
GROOMING_CURRENT_FUNCTION: SUPERVISION
AMBULATION ASSISTANCE: 1
FEEDING_WITHIN_DEFINED_LIMITS: 1

## 2025-07-07 ENCOUNTER — HOME CARE VISIT (OUTPATIENT)
Dept: HOME HEALTH SERVICES | Facility: HOME HEALTH | Age: 60
End: 2025-07-07
Payer: COMMERCIAL

## 2025-07-07 NOTE — HOME HEALTH
[facility] HOMECARE SERVICES SDOH  SDOH RESOURCE NAME:[facility]  RESOURCE CONTACT:Online Referral      MEDWISH( )  WRAAA( )  UNITEUS( )  [employer](x ) Bed/ Couch  DIGITAL/CONNECTION( )  HOUSING( )  TRANSPORATION( )  SOCIAL CONNECTIONS( )  STRESS/SUPPORT( )  UTLILITIES( )  DEPRESSION( )  DRUG/ALCOHOL/TOBACCO ( )  FINANCIAL STRAIN( )  FOOD INSECURITY( )  PASSPORT( )  PHONE/ADP PROGRAM( )  HOUSEHOLD/FURNITURE( )  ASSIT.LIVING( )     OTHER( )    FOLLOW UP: YES (x ) NO( )During visit I dicussed with pt daughter POA options for obtaining furniture due to pt furniture being compromised by a widespread infestation of insects I sent a referral to [facility] with a referral making new furishing a necessity for health and comfort. I will follow up with patient once I received a response from the [employer].  ADDTIONAL COMMENT:

## 2025-07-08 ENCOUNTER — HOME CARE VISIT (OUTPATIENT)
Dept: HOME HEALTH SERVICES | Facility: HOME HEALTH | Age: 60
End: 2025-07-08
Payer: COMMERCIAL

## 2025-07-08 PROCEDURE — G0157 HHC PT ASSISTANT EA 15: HCPCS | Mod: CQ

## 2025-07-08 SDOH — HEALTH STABILITY: PHYSICAL HEALTH: EXERCISE TYPE: POOR CARRYOVER, REFUSES HEP , SAYS SHE DOESNT NEED ANY THERAPY

## 2025-07-08 SDOH — HEALTH STABILITY: PHYSICAL HEALTH
EXERCISE COMMENTS: BIL LE STANDING THER EX: HEEL RAISES, ALTERNATE MARCHES, HIP ABDUCTION, HS CURLS, HIP FLEXION WITH KNEE EXTENSION, MINI SQUATS, HIP EXTENSION X 10 REPS

## 2025-07-08 ASSESSMENT — ACTIVITIES OF DAILY LIVING (ADL): AMBULATION ASSISTANCE ON FLAT SURFACES: 1

## 2025-07-08 ASSESSMENT — ENCOUNTER SYMPTOMS
PERSON REPORTING PAIN: PATIENT
DENIES PAIN: 1

## 2025-07-09 ENCOUNTER — HOME CARE VISIT (OUTPATIENT)
Dept: HOME HEALTH SERVICES | Facility: HOME HEALTH | Age: 60
End: 2025-07-09
Payer: COMMERCIAL

## 2025-07-09 PROCEDURE — G0152 HHCP-SERV OF OT,EA 15 MIN: HCPCS

## 2025-07-10 ASSESSMENT — ENCOUNTER SYMPTOMS
PERSON REPORTING PAIN: PATIENT
DENIES PAIN: 1

## 2025-07-10 ASSESSMENT — PAIN SCALES - PAIN ASSESSMENT IN ADVANCED DEMENTIA (PAINAD)
BODYLANGUAGE: 0 - RELAXED.
FACIALEXPRESSION: 0
CONSOLABILITY: 0 - NO NEED TO CONSOLE.
NEGVOCALIZATION: 0 - NONE.
NEGVOCALIZATION: 0
TOTALSCORE: 0
BODYLANGUAGE: 0
FACIALEXPRESSION: 0 - SMILING OR INEXPRESSIVE.
BREATHING: 0
CONSOLABILITY: 0

## 2025-07-10 ASSESSMENT — ACTIVITIES OF DAILY LIVING (ADL)
PREPARING MEALS: INDEPENDENT
PHYSICAL TRANSFERS ASSESSED: 1
TOILETING: 1
CURRENT_FUNCTION: INDEPENDENT
AMBULATION ASSISTANCE: 1
AMBULATION ASSISTANCE: SUPERVISION
GROOMING ASSESSED: 1

## 2025-07-11 VITALS
SYSTOLIC BLOOD PRESSURE: 124 MMHG | RESPIRATION RATE: 16 BRPM | HEART RATE: 78 BPM | OXYGEN SATURATION: 98 % | DIASTOLIC BLOOD PRESSURE: 68 MMHG

## 2025-07-11 SDOH — ECONOMIC STABILITY: HOUSING INSECURITY: ELEVATOR PRESENT: 1

## 2025-07-11 ASSESSMENT — ACTIVITIES OF DAILY LIVING (ADL)
OASIS_M1830: 05
ORAL_CARE_STANDBY_ASSIST: 1
HOME_HEALTH_OASIS: 00
SPONGING_UB_CURRENT_FUNCTION: CONTACT GUARD ASSIST
TOILETING: SUPERVISION
BATHING ASSESSED: 1
SPONGING_LB_CURRENT_FUNCTION: CONTACT GUARD ASSIST
GROOMING_CURRENT_FUNCTION: SUPERVISION
BATHING_CURRENT_FUNCTION: STAND BY ASSIST
FEEDING_WITHIN_DEFINED_LIMITS: 1

## 2025-07-11 ASSESSMENT — ENCOUNTER SYMPTOMS: AGITATION: 1

## 2025-07-24 ENCOUNTER — HOSPITAL ENCOUNTER (EMERGENCY)
Facility: HOSPITAL | Age: 60
Discharge: HOME | End: 2025-07-24
Attending: EMERGENCY MEDICINE
Payer: COMMERCIAL

## 2025-07-24 VITALS
OXYGEN SATURATION: 98 % | RESPIRATION RATE: 18 BRPM | HEART RATE: 82 BPM | SYSTOLIC BLOOD PRESSURE: 152 MMHG | TEMPERATURE: 97.9 F | BODY MASS INDEX: 25.01 KG/M2 | DIASTOLIC BLOOD PRESSURE: 79 MMHG | HEIGHT: 68 IN | WEIGHT: 165 LBS

## 2025-07-24 DIAGNOSIS — G25.1 DRUG-INDUCED TREMOR: Primary | ICD-10-CM

## 2025-07-24 PROCEDURE — 99283 EMERGENCY DEPT VISIT LOW MDM: CPT | Performed by: EMERGENCY MEDICINE

## 2025-07-24 PROCEDURE — 99284 EMERGENCY DEPT VISIT MOD MDM: CPT | Performed by: EMERGENCY MEDICINE

## 2025-07-24 RX ORDER — CARBIDOPA AND LEVODOPA 25; 100 MG/1; MG/1
1 TABLET ORAL ONCE
Status: CANCELLED | OUTPATIENT
Start: 2025-07-24

## 2025-07-24 RX ORDER — CARBIDOPA AND LEVODOPA 25; 100 MG/1; MG/1
1 TABLET, ORALLY DISINTEGRATING ORAL ONCE
Status: DISCONTINUED | OUTPATIENT
Start: 2025-07-24 | End: 2025-07-24

## 2025-07-24 ASSESSMENT — LIFESTYLE VARIABLES
EVER FELT BAD OR GUILTY ABOUT YOUR DRINKING: NO
HAVE YOU EVER FELT YOU SHOULD CUT DOWN ON YOUR DRINKING: NO
TOTAL SCORE: 0
EVER HAD A DRINK FIRST THING IN THE MORNING TO STEADY YOUR NERVES TO GET RID OF A HANGOVER: NO
HAVE PEOPLE ANNOYED YOU BY CRITICIZING YOUR DRINKING: NO

## 2025-07-24 ASSESSMENT — PAIN - FUNCTIONAL ASSESSMENT: PAIN_FUNCTIONAL_ASSESSMENT: 0-10

## 2025-07-24 ASSESSMENT — PAIN SCALES - GENERAL: PAINLEVEL_OUTOF10: 0 - NO PAIN

## 2025-07-24 NOTE — DISCHARGE INSTRUCTIONS
Please take your medications as prescribed. Please follow up with your neurologist as previously discussed.

## 2025-07-24 NOTE — ED PROVIDER NOTES
Emergency Department Provider Note       History of Present Illness     History provided by: Patient  Limitations to History: None  External Records Reviewed with Brief Summary: Review of patient records shows that patient is scheduled to follow up with Neurology in October.     HPI:  Alesia Fernandez is a 59 y.o. female with a history of schizophrenia/schizoaffective disorder on Invega injection, partial epilepsy, drug-induced parkinsonism who presented for involuntary movements/tremors.  Patient states she has had these tremors for quite some time, she states that these results of her bipolar epilepsy.  Patient adamant that she does not have a diagnosis of schizophrenia or other psychosis causing her symptoms.  Patient states she is presenting today as she is sick of the shaking she has been experiencing, and is unsatisfied with her outpatient management.  Patient states she has been taking her medications as prescribed, stating that she has been taking her Depakote.  Patient is unaware of the medication called carbidopa-levodopa.  Patient denies any recent head trauma, or any recent falls.    Physical Exam   Triage vitals:  T 36.6 °C (97.9 °F)  HR 82  /79  RR 18  O2 98 % None (Room air)    General: Awake, alert, in no acute distress  Eyes: Gaze conjugate.  No scleral icterus or injection  HENT: Normo-cephalic, atraumatic. No stridor  CV: Regular rate, regular rhythm.   Resp: Breathing non-labored, speaking in full sentences.  Clear to auscultation bilaterally  GI: Soft, non-distended, non-tender. No rebound or guarding.  MSK/Extremities: No gross bony deformities. Moving all extremities  Skin: Warm. Appropriate color  Neuro: Alert. Oriented. Face symmetric. Speech is fluent.  Gross strength and sensation intact in b/l UE and Les.  Tremor is partially functional, tremor will intermittently stop based on patient's actions.  Patient able to perform finger-to-nose test bilaterally.  Psych: Appropriate  mood and affect      Medical Decision Making & ED Course   Medical Decision Makin y.o. female with a history of schizophrenia/schizoaffective disorder on Invega injection, partial epilepsy, drug-induced parkinsonism who presented for involuntary movements/tremors.  Upon initial evaluation patient is well-appearing, with notable upper extremity tremor bilaterally, but is otherwise well-appearing with reassuring vital signs.  Differentials at this time include parkinsonism, involuntary tremors, intention tremor, schizoaffective.  Jammie discussed with patient what it is she would like to be done here in the emergency department.  Patient stating that she would like an EEG, noting that her last was 3 months ago.  Discussed with patient that an EEG would likely not be done here in the emergency department.  Discussed attempting to give patient her  listed prescribed indication of carbidopa-levodopa.   Patient now stating that she would like to leave, noting that she will simply follow-up with her neurologist and August, noting that he understands what is going on with her.  Patient does not wish to wait for a dose of her prescribed medication, but she would just like a ride home.  ----    Differential diagnoses considered include but are not limited to: As above    Social Determinants of Health which Significantly Impact Care: Social Determinants of Health which Significantly Impact Care: None identified     EKG Independent Interpretation: EKG not obtained    Independent Result Review and Interpretation: None obtained    Chronic conditions affecting the patient's care: As documented above in [facility]    The patient was discussed with the following consultants/services: None    Care Considerations: As documented above in [facility]    ED Course:  Diagnoses as of 25 0342   Drug-induced tremor       Disposition   As a result of the work-up, the patient was discharged home.  she was informed of her diagnosis and instructed  to come back with any concerns or worsening of condition.  she and was agreeable to the plan as discussed above.  she was given the opportunity to ask questions.  All of the patient's questions were answered.    Procedures   Procedures    Patient seen and discussed with ED attending physician.    Migel Cristobal DO  Emergency Medicine                                                       Migel Cristobal DO  Resident  07/24/25 [id number]

## 2025-08-29 ENCOUNTER — HOSPITAL ENCOUNTER (OUTPATIENT)
Facility: HOSPITAL | Age: 60
Setting detail: OBSERVATION
Discharge: HOME | End: 2025-08-30
Attending: EMERGENCY MEDICINE | Admitting: EMERGENCY MEDICINE
Payer: COMMERCIAL

## 2025-08-29 ENCOUNTER — APPOINTMENT (OUTPATIENT)
Dept: RADIOLOGY | Facility: HOSPITAL | Age: 60
End: 2025-08-29
Payer: COMMERCIAL

## 2025-08-29 ENCOUNTER — CLINICAL SUPPORT (OUTPATIENT)
Dept: EMERGENCY MEDICINE | Facility: HOSPITAL | Age: 60
End: 2025-08-29
Payer: COMMERCIAL

## 2025-08-29 PROBLEM — F23: Status: ACTIVE | Noted: 2025-08-29

## 2025-08-29 ASSESSMENT — LIFESTYLE VARIABLES
TOTAL SCORE: 0
EVER FELT BAD OR GUILTY ABOUT YOUR DRINKING: NO
EVER HAD A DRINK FIRST THING IN THE MORNING TO STEADY YOUR NERVES TO GET RID OF A HANGOVER: NO
HAVE PEOPLE ANNOYED YOU BY CRITICIZING YOUR DRINKING: NO
HAVE YOU EVER FELT YOU SHOULD CUT DOWN ON YOUR DRINKING: NO

## 2025-08-29 ASSESSMENT — PAIN SCALES - GENERAL: PAINLEVEL_OUTOF10: 0 - NO PAIN

## 2025-08-29 ASSESSMENT — PAIN - FUNCTIONAL ASSESSMENT: PAIN_FUNCTIONAL_ASSESSMENT: 0-10

## 2025-08-30 ENCOUNTER — APPOINTMENT (OUTPATIENT)
Dept: RADIOLOGY | Facility: HOSPITAL | Age: 60
End: 2025-08-30
Payer: COMMERCIAL

## 2025-08-30 ASSESSMENT — PAIN SCALES - GENERAL: PAINLEVEL_OUTOF10: 0 - NO PAIN
